# Patient Record
Sex: MALE | Race: WHITE | NOT HISPANIC OR LATINO | Employment: OTHER | URBAN - NONMETROPOLITAN AREA
[De-identification: names, ages, dates, MRNs, and addresses within clinical notes are randomized per-mention and may not be internally consistent; named-entity substitution may affect disease eponyms.]

---

## 2017-01-01 ENCOUNTER — OFFICE VISIT (OUTPATIENT)
Dept: FAMILY MEDICINE CLINIC | Facility: CLINIC | Age: 67
End: 2017-01-01

## 2017-01-01 ENCOUNTER — OFFICE VISIT (OUTPATIENT)
Dept: CARDIOLOGY | Facility: CLINIC | Age: 67
End: 2017-01-01

## 2017-01-01 ENCOUNTER — CLINICAL SUPPORT (OUTPATIENT)
Dept: FAMILY MEDICINE CLINIC | Facility: CLINIC | Age: 67
End: 2017-01-01

## 2017-01-01 ENCOUNTER — TELEPHONE (OUTPATIENT)
Dept: FAMILY MEDICINE CLINIC | Facility: CLINIC | Age: 67
End: 2017-01-01

## 2017-01-01 VITALS
HEIGHT: 72 IN | HEART RATE: 81 BPM | WEIGHT: 195.4 LBS | DIASTOLIC BLOOD PRESSURE: 58 MMHG | SYSTOLIC BLOOD PRESSURE: 116 MMHG | BODY MASS INDEX: 26.47 KG/M2

## 2017-01-01 VITALS
OXYGEN SATURATION: 97 % | BODY MASS INDEX: 26.68 KG/M2 | HEART RATE: 70 BPM | HEIGHT: 72 IN | WEIGHT: 197 LBS | SYSTOLIC BLOOD PRESSURE: 112 MMHG | DIASTOLIC BLOOD PRESSURE: 72 MMHG

## 2017-01-01 VITALS
DIASTOLIC BLOOD PRESSURE: 68 MMHG | WEIGHT: 199 LBS | HEART RATE: 80 BPM | BODY MASS INDEX: 27.86 KG/M2 | OXYGEN SATURATION: 98 % | SYSTOLIC BLOOD PRESSURE: 106 MMHG | HEIGHT: 71 IN

## 2017-01-01 VITALS
BODY MASS INDEX: 25.19 KG/M2 | SYSTOLIC BLOOD PRESSURE: 115 MMHG | HEIGHT: 72 IN | WEIGHT: 186 LBS | OXYGEN SATURATION: 96 % | TEMPERATURE: 98.7 F | DIASTOLIC BLOOD PRESSURE: 68 MMHG | HEART RATE: 100 BPM

## 2017-01-01 VITALS
BODY MASS INDEX: 26.82 KG/M2 | SYSTOLIC BLOOD PRESSURE: 115 MMHG | HEIGHT: 72 IN | OXYGEN SATURATION: 98 % | WEIGHT: 198 LBS | HEART RATE: 80 BPM | DIASTOLIC BLOOD PRESSURE: 84 MMHG

## 2017-01-01 VITALS
OXYGEN SATURATION: 94 % | TEMPERATURE: 100.6 F | SYSTOLIC BLOOD PRESSURE: 96 MMHG | HEART RATE: 68 BPM | WEIGHT: 190 LBS | HEIGHT: 72 IN | BODY MASS INDEX: 25.73 KG/M2 | DIASTOLIC BLOOD PRESSURE: 64 MMHG

## 2017-01-01 VITALS
SYSTOLIC BLOOD PRESSURE: 112 MMHG | HEART RATE: 94 BPM | DIASTOLIC BLOOD PRESSURE: 72 MMHG | HEIGHT: 71 IN | TEMPERATURE: 98.7 F | BODY MASS INDEX: 26.6 KG/M2 | WEIGHT: 190 LBS | OXYGEN SATURATION: 98 %

## 2017-01-01 DIAGNOSIS — I10 ESSENTIAL HYPERTENSION: ICD-10-CM

## 2017-01-01 DIAGNOSIS — J44.9 CHRONIC OBSTRUCTIVE PULMONARY DISEASE, UNSPECIFIED COPD TYPE (HCC): ICD-10-CM

## 2017-01-01 DIAGNOSIS — J30.89 OTHER ALLERGIC RHINITIS: ICD-10-CM

## 2017-01-01 DIAGNOSIS — G89.29 CHRONIC RIGHT HIP PAIN: Primary | ICD-10-CM

## 2017-01-01 DIAGNOSIS — M16.11 ARTHRITIS OF RIGHT HIP: ICD-10-CM

## 2017-01-01 DIAGNOSIS — E78.00 HYPERCHOLESTEROLEMIA: ICD-10-CM

## 2017-01-01 DIAGNOSIS — J11.1 INFLUENZA: ICD-10-CM

## 2017-01-01 DIAGNOSIS — S39.012A LUMBAR STRAIN, INITIAL ENCOUNTER: Primary | ICD-10-CM

## 2017-01-01 DIAGNOSIS — R05.9 COUGH: ICD-10-CM

## 2017-01-01 DIAGNOSIS — J44.1 CHRONIC OBSTRUCTIVE PULMONARY DISEASE WITH ACUTE EXACERBATION (HCC): Primary | ICD-10-CM

## 2017-01-01 DIAGNOSIS — J98.01 BRONCHOSPASM, ACUTE: ICD-10-CM

## 2017-01-01 DIAGNOSIS — Z23 NEED FOR INFLUENZA VACCINATION: ICD-10-CM

## 2017-01-01 DIAGNOSIS — E79.0 ELEVATED URIC ACID IN BLOOD: ICD-10-CM

## 2017-01-01 DIAGNOSIS — R63.4 ABNORMAL WEIGHT LOSS: ICD-10-CM

## 2017-01-01 DIAGNOSIS — I50.42 CHRONIC COMBINED SYSTOLIC AND DIASTOLIC CONGESTIVE HEART FAILURE (HCC): ICD-10-CM

## 2017-01-01 DIAGNOSIS — M25.551 CHRONIC RIGHT HIP PAIN: Primary | ICD-10-CM

## 2017-01-01 DIAGNOSIS — M19.90 ARTHRITIS: Primary | ICD-10-CM

## 2017-01-01 DIAGNOSIS — R50.81 FEVER IN OTHER DISEASES: ICD-10-CM

## 2017-01-01 DIAGNOSIS — Z23 NEED FOR ZOSTER VACCINATION: ICD-10-CM

## 2017-01-01 DIAGNOSIS — I25.10 CORONARY ARTERIOSCLEROSIS IN NATIVE ARTERY: Primary | ICD-10-CM

## 2017-01-01 DIAGNOSIS — K13.79 ORAL CAVITY PAIN: Primary | ICD-10-CM

## 2017-01-01 DIAGNOSIS — H81.12 BPPV (BENIGN PAROXYSMAL POSITIONAL VERTIGO), LEFT: Primary | ICD-10-CM

## 2017-01-01 DIAGNOSIS — J10.1 INFLUENZA A: Primary | ICD-10-CM

## 2017-01-01 LAB
CONV COMMENT: ABNORMAL
EXPIRATION DATE: ABNORMAL
FLUAV AG NPH QL: POSITIVE
FLUBV AG NPH QL: ABNORMAL
INTERNAL CONTROL: ABNORMAL
Lab: ABNORMAL
PSA SERPL-MCNC: 0.72 NG/ML (ref 0.06–4)
TESTOST FREE SERPL-MCNC: 0.6 PG/ML (ref 6.6–18.1)
TESTOST SERPL-MCNC: 56 NG/DL (ref 348–1197)

## 2017-01-01 PROCEDURE — 96372 THER/PROPH/DIAG INJ SC/IM: CPT | Performed by: FAMILY MEDICINE

## 2017-01-01 PROCEDURE — 99214 OFFICE O/P EST MOD 30 MIN: CPT | Performed by: FAMILY MEDICINE

## 2017-01-01 PROCEDURE — 90662 IIV NO PRSV INCREASED AG IM: CPT | Performed by: FAMILY MEDICINE

## 2017-01-01 PROCEDURE — 87804 INFLUENZA ASSAY W/OPTIC: CPT | Performed by: FAMILY MEDICINE

## 2017-01-01 PROCEDURE — 99214 OFFICE O/P EST MOD 30 MIN: CPT | Performed by: INTERNAL MEDICINE

## 2017-01-01 PROCEDURE — G0008 ADMIN INFLUENZA VIRUS VAC: HCPCS | Performed by: FAMILY MEDICINE

## 2017-01-01 RX ORDER — METHYLPREDNISOLONE ACETATE 80 MG/ML
120 INJECTION, SUSPENSION INTRA-ARTICULAR; INTRALESIONAL; INTRAMUSCULAR; SOFT TISSUE ONCE
Status: COMPLETED | OUTPATIENT
Start: 2017-01-01 | End: 2017-01-01

## 2017-01-01 RX ORDER — NAPROXEN 375 MG/1
375 TABLET ORAL 2 TIMES DAILY PRN
COMMUNITY

## 2017-01-01 RX ORDER — GUAIFENESIN 600 MG/1
600 TABLET, EXTENDED RELEASE ORAL 2 TIMES DAILY
Qty: 30 TABLET | Refills: 0 | Status: SHIPPED | OUTPATIENT
Start: 2017-01-01 | End: 2017-01-01

## 2017-01-01 RX ORDER — FLUTICASONE PROPIONATE 50 MCG
SPRAY, SUSPENSION (ML) NASAL
Qty: 1 BOTTLE | Refills: 5 | Status: SHIPPED | OUTPATIENT
Start: 2017-01-01

## 2017-01-01 RX ORDER — ALBUTEROL SULFATE 2.5 MG/3ML
2.5 SOLUTION RESPIRATORY (INHALATION) EVERY 4 HOURS PRN
Qty: 90 ML | Refills: 1 | Status: SHIPPED | OUTPATIENT
Start: 2017-01-01 | End: 2018-01-01 | Stop reason: SDUPTHER

## 2017-01-01 RX ORDER — MONTELUKAST SODIUM 10 MG/1
TABLET ORAL
Qty: 30 TABLET | Refills: 5 | Status: SHIPPED | OUTPATIENT
Start: 2017-01-01

## 2017-01-01 RX ORDER — HYDROCODONE BITARTRATE AND ACETAMINOPHEN 5; 325 MG/1; MG/1
TABLET ORAL
COMMUNITY
Start: 2017-01-01 | End: 2017-01-01

## 2017-01-01 RX ORDER — METHYLPREDNISOLONE 4 MG/1
TABLET ORAL
Qty: 21 TABLET | Refills: 0 | Status: SHIPPED | OUTPATIENT
Start: 2017-01-01 | End: 2017-01-01

## 2017-01-01 RX ORDER — NAPROXEN 375 MG/1
TABLET ORAL
Qty: 60 TABLET | Refills: 2 | Status: SHIPPED | OUTPATIENT
Start: 2017-01-01 | End: 2017-01-01

## 2017-01-01 RX ORDER — PREDNISONE 20 MG/1
20 TABLET ORAL DAILY
Qty: 7 TABLET | Refills: 0 | Status: SHIPPED | OUTPATIENT
Start: 2017-01-01 | End: 2017-01-01

## 2017-01-01 RX ORDER — MECLIZINE HCL 12.5 MG/1
12.5 TABLET ORAL 3 TIMES DAILY PRN
Qty: 30 TABLET | Refills: 0 | Status: SHIPPED | OUTPATIENT
Start: 2017-01-01

## 2017-01-01 RX ORDER — OSELTAMIVIR PHOSPHATE 75 MG/1
75 CAPSULE ORAL 2 TIMES DAILY
Qty: 10 CAPSULE | Refills: 0 | Status: SHIPPED | OUTPATIENT
Start: 2017-01-01 | End: 2017-01-01

## 2017-01-01 RX ORDER — KETOROLAC TROMETHAMINE 30 MG/ML
60 INJECTION, SOLUTION INTRAMUSCULAR; INTRAVENOUS ONCE
Status: COMPLETED | OUTPATIENT
Start: 2017-01-01 | End: 2017-01-01

## 2017-01-01 RX ORDER — AZITHROMYCIN 250 MG/1
TABLET, FILM COATED ORAL
COMMUNITY
Start: 2017-01-01 | End: 2017-01-01

## 2017-01-01 RX ORDER — ORPHENADRINE CITRATE 100 MG/1
100 TABLET, EXTENDED RELEASE ORAL
COMMUNITY
Start: 2017-01-01 | End: 2017-01-01

## 2017-01-01 RX ORDER — IBUPROFEN 800 MG/1
TABLET ORAL
COMMUNITY
Start: 2017-01-01 | End: 2017-01-01

## 2017-01-01 RX ORDER — GUAIFENESIN AND CODEINE PHOSPHATE 100; 10 MG/5ML; MG/5ML
SOLUTION ORAL
Qty: 180 ML | Refills: 0 | Status: SHIPPED | OUTPATIENT
Start: 2017-01-01 | End: 2018-01-01

## 2017-01-01 RX ORDER — METHYLPREDNISOLONE ACETATE 80 MG/ML
80 INJECTION, SUSPENSION INTRA-ARTICULAR; INTRALESIONAL; INTRAMUSCULAR; SOFT TISSUE ONCE
Status: COMPLETED | OUTPATIENT
Start: 2017-01-01 | End: 2017-01-01

## 2017-01-01 RX ORDER — DOXYCYCLINE 100 MG/1
100 TABLET ORAL 2 TIMES DAILY
Qty: 20 TABLET | Refills: 0 | Status: SHIPPED | OUTPATIENT
Start: 2017-01-01 | End: 2017-01-01

## 2017-01-01 RX ORDER — ALLOPURINOL 100 MG/1
100 TABLET ORAL DAILY
Qty: 30 TABLET | Refills: 2 | Status: SHIPPED | OUTPATIENT
Start: 2017-01-01

## 2017-01-01 RX ORDER — PREDNISONE 20 MG/1
TABLET ORAL
Qty: 14 TABLET | Refills: 0 | Status: SHIPPED | OUTPATIENT
Start: 2017-01-01 | End: 2018-01-01 | Stop reason: SDUPTHER

## 2017-01-01 RX ADMIN — METHYLPREDNISOLONE ACETATE 120 MG: 80 INJECTION, SUSPENSION INTRA-ARTICULAR; INTRALESIONAL; INTRAMUSCULAR; SOFT TISSUE at 12:20

## 2017-01-01 RX ADMIN — METHYLPREDNISOLONE ACETATE 120 MG: 80 INJECTION, SUSPENSION INTRA-ARTICULAR; INTRALESIONAL; INTRAMUSCULAR; SOFT TISSUE at 13:18

## 2017-01-01 RX ADMIN — METHYLPREDNISOLONE ACETATE 80 MG: 80 INJECTION, SUSPENSION INTRA-ARTICULAR; INTRALESIONAL; INTRAMUSCULAR; SOFT TISSUE at 17:14

## 2017-01-01 RX ADMIN — KETOROLAC TROMETHAMINE 60 MG: 30 INJECTION, SOLUTION INTRAMUSCULAR; INTRAVENOUS at 17:13

## 2017-01-03 ENCOUNTER — OFFICE VISIT (OUTPATIENT)
Dept: FAMILY MEDICINE CLINIC | Facility: CLINIC | Age: 67
End: 2017-01-03

## 2017-01-03 ENCOUNTER — TELEPHONE (OUTPATIENT)
Dept: FAMILY MEDICINE CLINIC | Facility: CLINIC | Age: 67
End: 2017-01-03

## 2017-01-03 VITALS
DIASTOLIC BLOOD PRESSURE: 76 MMHG | SYSTOLIC BLOOD PRESSURE: 112 MMHG | WEIGHT: 199 LBS | HEART RATE: 87 BPM | HEIGHT: 72 IN | OXYGEN SATURATION: 98 % | BODY MASS INDEX: 26.95 KG/M2

## 2017-01-03 DIAGNOSIS — I49.9 IRREGULAR HEART RHYTHM: ICD-10-CM

## 2017-01-03 DIAGNOSIS — L98.9 FACIAL LESION: Primary | ICD-10-CM

## 2017-01-03 DIAGNOSIS — E03.8 OTHER SPECIFIED HYPOTHYROIDISM: ICD-10-CM

## 2017-01-03 DIAGNOSIS — J34.2 NASAL SEPTAL DEVIATION: ICD-10-CM

## 2017-01-03 DIAGNOSIS — E78.49 OTHER HYPERLIPIDEMIA: ICD-10-CM

## 2017-01-03 DIAGNOSIS — J44.9 CHRONIC OBSTRUCTIVE PULMONARY DISEASE, UNSPECIFIED COPD TYPE (HCC): ICD-10-CM

## 2017-01-03 DIAGNOSIS — I10 ESSENTIAL HYPERTENSION: ICD-10-CM

## 2017-01-03 DIAGNOSIS — G56.02 CARPAL TUNNEL SYNDROME OF LEFT WRIST: ICD-10-CM

## 2017-01-03 DIAGNOSIS — E11.9 TYPE 2 DIABETES MELLITUS WITHOUT COMPLICATION, WITHOUT LONG-TERM CURRENT USE OF INSULIN (HCC): ICD-10-CM

## 2017-01-03 DIAGNOSIS — E55.9 VITAMIN D DEFICIENCY: ICD-10-CM

## 2017-01-03 DIAGNOSIS — Z11.59 NEED FOR HEPATITIS C SCREENING TEST: ICD-10-CM

## 2017-01-03 PROCEDURE — 99214 OFFICE O/P EST MOD 30 MIN: CPT | Performed by: FAMILY MEDICINE

## 2017-01-03 NOTE — MR AVS SNAPSHOT
Jacob Rivers   1/3/2017 9:00 AM   Office Visit    Dept Phone:  [phone]   Encounter #:  [id number]    Provider:  Lacie Ernst MD   Department:  [facility] PRIMARY CARE                Your Full Care Plan              Your Updated Medication List          This list is accurate as of: 1/3/17  9:20 AM.  Always use your most recent med list.                albuterol 108 (90 BASE) MCG/ACT inhaler   Commonly known as:  PROVENTIL HFA;VENTOLIN HFA       aspirin 81 MG tablet       carvedilol 3.125 MG tablet   Commonly known as:  COREG       cetirizine 10 MG tablet   Commonly known as:  zyrTEC       cyanocobalamin 1000 MCG tablet   Commonly known as:  VITAMIN B-12       enalapril 5 MG tablet   Commonly known as:  VASOTEC       ferrous sulfate 325 (65 FE) MG tablet       fluticasone 50 MCG/ACT nasal spray   Commonly known as:  FLONASE       folic acid 1 MG tablet   Commonly known as:  FOLVITE       furosemide 40 MG tablet   Commonly known as:  LASIX       levothyroxine 75 MCG tablet   Commonly known as:  SYNTHROID, LEVOTHROID       montelukast 10 MG tablet   Commonly known as:  SINGULAIR   Take 1 tablet by mouth every night. Take one tablet at bedtime for seasonal allergies.       omeprazole 20 MG capsule   Commonly known as:  priLOSEC       potassium chloride 20 MEQ CR tablet   Commonly known as:  K-DUR,KLOR-CON       rosuvastatin 40 MG tablet   Commonly known as:  CRESTOR       tamsulosin 0.4 MG capsule 24 hr capsule   Commonly known as:  FLOMAX       tiotropium 18 MCG per inhalation capsule   Commonly known as:  SPIRIVA       traMADol 50 MG tablet   Commonly known as:  ULTRAM       valACYclovir 1000 MG tablet   Commonly known as:  VALTREX               You Were Diagnosed With        Codes Comments    Facial lesion    -  Primary ICD-10-CM: L98.9  ICD-9-CM: 709.9     Essential hypertension     ICD-10-CM: I10  ICD-9-CM: 401.9     Chronic obstructive pulmonary disease,  "unspecified COPD type     ICD-10-CM: J44.9  ICD-9-CM: 496     Other specified hypothyroidism     ICD-10-CM: E03.8  ICD-9-CM: 244.8     Other hyperlipidemia     ICD-10-CM: E78.4  ICD-9-CM: 272.4     Carpal tunnel syndrome of left wrist     ICD-10-CM: G56.02  ICD-9-CM: 354.0     Type 2 diabetes mellitus without complication, without long-term current use of insulin     ICD-10-CM: E11.9  ICD-9-CM: 250.00       Instructions     None    Patient Instructions History      Upcoming Appointments     Visit Type Date Time Department    FOLLOW UP 1/3/2017  9:00 AM [facility]    FOLLOW UP 1/19/2017 10:45 AM [facility]      [facility] Signup     Our records indicate that you have declined [facility] [employer] signup. If you would like to sign up for [employer], please email [email or web address] or call [phone] to obtain an activation code.             Other Info from Your Visit           Your Appointments     Jan 19, 2017 10:45 AM EST   Follow Up with Logan Mcdonald MD   [facility] (--)    [address] [address] [phone]           Arrive 15 minutes prior to appointment.              Allergies     Penicillins        Reason for Visit     Nasal Congestion     Cyst on side of nose.      Vital Signs     Blood Pressure Pulse Height Weight Oxygen Saturation Body Mass Index    112/76 87 72\" (182.9 cm) 199 lb (90.3 kg) 98% 26.99 kg/m2    Smoking Status                   Former Smoker           Problems and Diagnoses Noted     Carpal tunnel syndrome on left    Chronic airway obstruction    Diabetes    High cholesterol or triglycerides    High blood pressure    Underactive thyroid    Facial lesion    -  Primary        "

## 2017-01-03 NOTE — PROGRESS NOTES
"Subjective   Jacob Kalpesh Rivers is a 66 y.o. male.     History of Present Illness   Mr. Rivers is a 67 [facility] Dr. Mcdonald and Dr. Finley. He has h/o COPD/lung CA, HTN, HLP, CAD s/p CABG. Has upcoming f/u visit with Dr. Mcdonald.  Has had regular f/u with pulmonology as well. No recent changes in medications. He denies CP or palpitations.  His SOA is stable.     Since his last visit he has had eval per Dr. Pete in ortho as well as NCS per Dr. 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 He has decided to put off surgery at this time.     He complaints of enlarging cystic lesion left cheek. Occasionally able to express \"cheesy\" material of mass.  Mildly TTP.    Other [facility] include diet controlled DM, hypothyroidism. Taking thyroid replacement as rx'd. Due for f/u labs.  He is not fasting today. Due for re-eval of vit D def as weel.    He continues to have chronic nasal congestion related to nasal septum deviation.  Has had eval per ENT previously but chose not to have surgery.    The following portions of the patient's history were reviewed and updated as appropriate: allergies, current medications, past family history, past medical history, past social history, past surgical history and problem list.    Review of Systems   Constitutional: Positive for fatigue. Negative for activity change, appetite change, diaphoresis, fever and unexpected weight change.   HENT: Positive for hearing loss (chronic). Negative for congestion, mouth sores, nosebleeds, rhinorrhea, sinus pressure, sneezing, sore throat and trouble swallowing.    Eyes: Negative.  Negative for visual disturbance.   Respiratory: Positive for cough (intermittent dry) and shortness of breath (at baseline in regard to RAYO). Negative for wheezing.    Cardiovascular: Negative.  Negative for chest pain, palpitations and leg swelling.   Gastrointestinal: Negative.  Negative for abdominal pain, " blood in stool, diarrhea, nausea and vomiting.   Endocrine: Negative.  Negative for polydipsia and polyuria.   Genitourinary: Negative.  Negative for dysuria and hematuria.   Musculoskeletal: Positive for arthralgias, joint swelling and neck stiffness.   Skin: Negative for rash and wound.        See HPI   Neurological: Positive for dizziness and numbness. Negative for tremors, syncope, weakness and headaches.   Hematological: Negative.    Psychiatric/Behavioral: Negative.  Negative for confusion, dysphoric mood and sleep disturbance. The patient is not nervous/anxious.      Objective    Vitals:    01/03/17 0856   BP: 112/76   Pulse: 87   SpO2: 98%     Body mass index is 26.99 kg/(m^2).  Last 2 weights    01/03/17  0856   Weight: 199 lb (90.3 kg)     Physical Exam   Constitutional: He is oriented to person, place, and time. He appears well-developed and well-nourished. He is cooperative. He does not appear ill. No distress.   HENT:   Head: Normocephalic and atraumatic.   Right Ear: Decreased hearing is noted.   Left Ear: Decreased hearing is noted.   Nose: Septal deviation (to left with occlusion of right nasal passage) present. No mucosal edema or rhinorrhea. No epistaxis.   Mouth/Throat: Uvula is midline, oropharynx is clear and moist and mucous membranes are normal. Abnormal dentition.   Dental implants in place   Eyes: Conjunctivae and lids are normal.   Neck: Phonation normal. Neck supple. Normal carotid pulses present. No thyroid mass and no thyromegaly present.   Cardiovascular: Normal rate, S1 normal, S2 normal and intact distal pulses.  A regularly irregular rhythm present.   Murmur heard.   Systolic (along sternal border) murmur is present with a grade of 2/6   Premature beat every 3rd beat   Pulmonary/Chest: Effort normal. No respiratory distress. He has decreased breath sounds in the left upper field, the left middle field and the left lower field. He has no wheezes. He has no rhonchi. He has no rales.    Musculoskeletal:        Left wrist: He exhibits decreased range of motion (mildly decreased all planes due to pain), bony tenderness (diffuse), swelling (mild) and deformity (chronic joint deformity). He exhibits no crepitus.       Vascular Status -  His exam exhibits no right foot edema. His exam exhibits no left foot edema.  Lymphadenopathy:     He has no cervical adenopathy.   Neurological: He is alert and oriented to person, place, and time. He has normal strength. He displays no tremor. No cranial nerve deficit or sensory deficit. Gait normal.   Skin: Skin is warm and dry. Lesion (cystic lesion left cheek, mildly TTP) noted. No bruising and no rash noted.   Psychiatric: He has a normal mood and affect. His behavior is normal.   Nursing note and vitals reviewed.    Consult note per Dr. Pete reviewed on chart.  EMG/NCS reviewed on chart.    Assessment/Plan   Jacob was seen today for nasal congestion and cyst.    Diagnoses and all orders for this visit:    Facial lesion  -     Ambulatory Referral to Dermatology    Essential hypertension  -     CBC (No Diff); Future  -     Comprehensive Metabolic Panel; Future  -     TSH; Future    Chronic obstructive pulmonary disease, unspecified COPD type  -     CBC (No Diff); Future  -     Comprehensive Metabolic Panel; Future    Other specified hypothyroidism    Other hyperlipidemia  -     Comprehensive Metabolic Panel; Future  -     Lipid Panel; Future  -     TSH; Future    Carpal tunnel syndrome of left wrist    Type 2 diabetes mellitus without complication, without long-term current use of insulin  -     Comprehensive Metabolic Panel; Future  -     TSH; Future  -     Hemoglobin A1c; Future    Vitamin D deficiency  -     Vitamin D 25 Hydroxy; Future    Need for hepatitis C screening test  -     Hepatitis C Antibody; Future    Nasal septal deviation    Irregular heart rhythm    Regularly irregular heart rhythm but BP well controlled, HR normal and pt clinically stable.  I  will contact patient regarding test results and provide instructions regarding any necessary changes in plan of care.  Continue current meds for now.  F/U with Dr. Mcdonald and pulmonology as scheduled.  F/u with me in 3 months, sooner as needed/instructed.  Heart healthy diet, regular walking.  Patient was encouraged to keep me informed of any acute changes, or any new concerning symptoms.  Patient voiced understanding of all instructions and denied further questions.

## 2017-01-06 ENCOUNTER — LAB (OUTPATIENT)
Dept: FAMILY MEDICINE CLINIC | Facility: CLINIC | Age: 67
End: 2017-01-06

## 2017-01-06 DIAGNOSIS — E55.9 VITAMIN D DEFICIENCY: ICD-10-CM

## 2017-01-06 DIAGNOSIS — E78.49 OTHER HYPERLIPIDEMIA: ICD-10-CM

## 2017-01-06 DIAGNOSIS — I10 ESSENTIAL HYPERTENSION: ICD-10-CM

## 2017-01-06 DIAGNOSIS — Z11.59 NEED FOR HEPATITIS C SCREENING TEST: ICD-10-CM

## 2017-01-06 DIAGNOSIS — E11.9 TYPE 2 DIABETES MELLITUS WITHOUT COMPLICATION, WITHOUT LONG-TERM CURRENT USE OF INSULIN (HCC): ICD-10-CM

## 2017-01-06 DIAGNOSIS — J44.9 CHRONIC OBSTRUCTIVE PULMONARY DISEASE, UNSPECIFIED COPD TYPE (HCC): ICD-10-CM

## 2017-01-06 LAB
25(OH)D3 SERPL-MCNC: 15 NG/ML
ALBUMIN SERPL-MCNC: 3.9 G/DL (ref 3.2–4.8)
ALBUMIN/GLOB SERPL: 1.3 G/DL (ref 1.5–2.5)
ALP SERPL-CCNC: 87 U/L (ref 25–100)
ALT SERPL W P-5'-P-CCNC: 7 U/L (ref 7–40)
ANION GAP SERPL CALCULATED.3IONS-SCNC: 3 MMOL/L (ref 3–11)
ARTICHOKE IGE QN: 74 MG/DL (ref 0–130)
AST SERPL-CCNC: 11 U/L (ref 0–33)
BILIRUB SERPL-MCNC: 0.7 MG/DL (ref 0.3–1.2)
BUN BLD-MCNC: 16 MG/DL (ref 9–23)
BUN/CREAT SERPL: 16 (ref 7–25)
CALCIUM SPEC-SCNC: 9.5 MG/DL (ref 8.7–10.4)
CHLORIDE SERPL-SCNC: 101 MMOL/L (ref 99–109)
CHOLEST SERPL-MCNC: 145 MG/DL (ref 0–200)
CO2 SERPL-SCNC: 37 MMOL/L (ref 20–31)
CREAT BLD-MCNC: 1 MG/DL (ref 0.6–1.3)
DEPRECATED RDW RBC AUTO: 45.3 FL (ref 37–54)
ERYTHROCYTE [DISTWIDTH] IN BLOOD BY AUTOMATED COUNT: 13.8 % (ref 11.3–14.5)
GFR SERPL CREATININE-BSD FRML MDRD: 75 ML/MIN/1.73
GLOBULIN UR ELPH-MCNC: 2.9 GM/DL
GLUCOSE BLD-MCNC: 94 MG/DL (ref 70–100)
HBA1C MFR BLD: 5.6 % (ref 4.8–5.6)
HCT VFR BLD AUTO: 35.2 % (ref 38.9–50.9)
HCV AB SER DONR QL: NORMAL
HDLC SERPL-MCNC: 54 MG/DL (ref 40–60)
HGB BLD-MCNC: 11.2 G/DL (ref 13.1–17.5)
MCH RBC QN AUTO: 28.4 PG (ref 27–31)
MCHC RBC AUTO-ENTMCNC: 31.8 G/DL (ref 32–36)
MCV RBC AUTO: 89.1 FL (ref 80–99)
PLATELET # BLD AUTO: 214 10*3/MM3 (ref 150–450)
PMV BLD AUTO: 9.7 FL (ref 6–12)
POTASSIUM BLD-SCNC: 4.1 MMOL/L (ref 3.5–5.5)
PROT SERPL-MCNC: 6.8 G/DL (ref 5.7–8.2)
RBC # BLD AUTO: 3.95 10*6/MM3 (ref 4.2–5.76)
SODIUM BLD-SCNC: 141 MMOL/L (ref 132–146)
TRIGL SERPL-MCNC: 71 MG/DL (ref 0–150)
TSH SERPL DL<=0.05 MIU/L-ACNC: 0.98 MIU/ML (ref 0.35–5.35)
WBC NRBC COR # BLD: 7.04 10*3/MM3 (ref 3.5–10.8)

## 2017-01-06 PROCEDURE — 80053 COMPREHEN METABOLIC PANEL: CPT | Performed by: FAMILY MEDICINE

## 2017-01-06 PROCEDURE — 85027 COMPLETE CBC AUTOMATED: CPT | Performed by: FAMILY MEDICINE

## 2017-01-06 PROCEDURE — 86803 HEPATITIS C AB TEST: CPT | Performed by: FAMILY MEDICINE

## 2017-01-06 PROCEDURE — 80061 LIPID PANEL: CPT | Performed by: FAMILY MEDICINE

## 2017-01-06 PROCEDURE — 83036 HEMOGLOBIN GLYCOSYLATED A1C: CPT | Performed by: FAMILY MEDICINE

## 2017-01-06 PROCEDURE — 36415 COLL VENOUS BLD VENIPUNCTURE: CPT | Performed by: FAMILY MEDICINE

## 2017-01-06 PROCEDURE — 84443 ASSAY THYROID STIM HORMONE: CPT | Performed by: FAMILY MEDICINE

## 2017-01-06 PROCEDURE — 82306 VITAMIN D 25 HYDROXY: CPT | Performed by: FAMILY MEDICINE

## 2017-01-09 ENCOUNTER — OFFICE VISIT (OUTPATIENT)
Dept: FAMILY MEDICINE CLINIC | Facility: CLINIC | Age: 67
End: 2017-01-09

## 2017-01-09 VITALS
SYSTOLIC BLOOD PRESSURE: 112 MMHG | HEIGHT: 72 IN | DIASTOLIC BLOOD PRESSURE: 72 MMHG | HEART RATE: 82 BPM | OXYGEN SATURATION: 98 % | WEIGHT: 199 LBS | BODY MASS INDEX: 26.95 KG/M2 | TEMPERATURE: 99.7 F

## 2017-01-09 DIAGNOSIS — J44.1 COPD EXACERBATION (HCC): Primary | ICD-10-CM

## 2017-01-09 PROCEDURE — 96372 THER/PROPH/DIAG INJ SC/IM: CPT | Performed by: FAMILY MEDICINE

## 2017-01-09 PROCEDURE — 99214 OFFICE O/P EST MOD 30 MIN: CPT | Performed by: FAMILY MEDICINE

## 2017-01-09 RX ORDER — CEFTRIAXONE 1 G/1
1 INJECTION, POWDER, FOR SOLUTION INTRAMUSCULAR; INTRAVENOUS ONCE
Status: COMPLETED | OUTPATIENT
Start: 2017-01-09 | End: 2017-01-09

## 2017-01-09 RX ORDER — AZITHROMYCIN 250 MG/1
TABLET, FILM COATED ORAL
Qty: 6 TABLET | Refills: 0 | Status: SHIPPED | OUTPATIENT
Start: 2017-01-09 | End: 2017-01-19

## 2017-01-09 RX ORDER — METHYLPREDNISOLONE ACETATE 80 MG/ML
120 INJECTION, SUSPENSION INTRA-ARTICULAR; INTRALESIONAL; INTRAMUSCULAR; SOFT TISSUE ONCE
Status: COMPLETED | OUTPATIENT
Start: 2017-01-09 | End: 2017-01-09

## 2017-01-09 RX ORDER — ALBUTEROL SULFATE 2.5 MG/3ML
2.5 SOLUTION RESPIRATORY (INHALATION) EVERY 4 HOURS PRN
Qty: 90 ML | Refills: 1 | Status: SHIPPED | OUTPATIENT
Start: 2017-01-09 | End: 2017-01-01 | Stop reason: SDUPTHER

## 2017-01-09 RX ORDER — PREDNISONE 20 MG/1
TABLET ORAL
Qty: 10 TABLET | Refills: 0 | Status: SHIPPED | OUTPATIENT
Start: 2017-01-09 | End: 2017-01-19

## 2017-01-09 RX ADMIN — METHYLPREDNISOLONE ACETATE 120 MG: 80 INJECTION, SUSPENSION INTRA-ARTICULAR; INTRALESIONAL; INTRAMUSCULAR; SOFT TISSUE at 11:57

## 2017-01-09 RX ADMIN — CEFTRIAXONE 1 G: 1 INJECTION, POWDER, FOR SOLUTION INTRAMUSCULAR; INTRAVENOUS at 11:58

## 2017-01-09 NOTE — PROGRESS NOTES
Subjective   Jacob Rivers is a 66 y.o. male.     Shortness of Breath   This is a new problem. The current episode started in the past 7 days. The problem occurs constantly. The problem has been gradually worsening. Associated symptoms include chest pain (soreness in right lung), hemoptysis (minimal), sputum production (mucoid) and wheezing. Pertinent negatives include no abdominal pain, coryza, ear pain, fever, headaches, leg swelling, orthopnea, PND, rash, rhinorrhea, sore throat, swollen glands, syncope or vomiting. The symptoms are aggravated by lying flat and exercise (cold air). Risk factors: non-smoker. He has tried beta agonist inhalers for the symptoms. The treatment provided mild relief. His past medical history is significant for CAD, chronic lung disease, COPD and a heart failure.   Has had recent ill exposure (URI/bronchitis symptoms).    The following portions of the patient's history were reviewed and updated as appropriate: allergies, current medications, past family history, past medical history, past social history and past surgical history. Problem list updated as well.    Review of Systems   Constitutional: Positive for fatigue. Negative for chills and fever.   HENT: Negative for ear pain, rhinorrhea, sore throat and trouble swallowing.    Eyes: Negative for pain, discharge and redness.   Respiratory: Positive for cough (slight increase from baseline), hemoptysis (minimal), sputum production (mucoid), shortness of breath and wheezing.    Cardiovascular: Positive for chest pain (soreness in right lung). Negative for palpitations, orthopnea, leg swelling, syncope and PND.   Gastrointestinal: Negative for abdominal pain, diarrhea, nausea and vomiting.   Genitourinary: Negative for dysuria and hematuria.   Musculoskeletal: Positive for arthralgias. Negative for myalgias.   Skin: Negative for rash.   Neurological: Positive for weakness (generalized). Negative for dizziness, syncope and headaches.    Psychiatric/Behavioral: Positive for sleep disturbance. Negative for confusion.     Objective    Vitals:    01/09/17 1025   BP: 112/72   Pulse: 82   Temp: 99.7 °F (37.6 °C)   SpO2: 98%     Body mass index is 26.99 kg/(m^2).  Last 2 weights    01/09/17  1025   Weight: 199 lb (90.3 kg)     Physical Exam   Constitutional: He is oriented to person, place, and time. He appears well-developed and well-nourished. He is cooperative. He appears ill (mildly). No distress.   HENT:   Head: Normocephalic and atraumatic.   Right Ear: Tympanic membrane, external ear and ear canal normal. Decreased hearing is noted.   Left Ear: Tympanic membrane, external ear and ear canal normal. Decreased hearing is noted.   Nose: Mucosal edema present. No rhinorrhea.   Mouth/Throat: Uvula is midline, oropharynx is clear and moist and mucous membranes are normal. Mucous membranes are not dry. No oral lesions. No posterior oropharyngeal edema or posterior oropharyngeal erythema.   Eyes: Conjunctivae and lids are normal.   Neck: Neck supple. No thyroid mass and no thyromegaly present.   Cardiovascular: Normal rate, regular rhythm and intact distal pulses.    Pulmonary/Chest: Effort normal. No respiratory distress. He has decreased breath sounds in the left upper field, the left middle field and the left lower field. He has no wheezes. He has no rhonchi. He has rales in the right middle field and the right lower field.       Vascular Status -  His exam exhibits no right foot edema. His exam exhibits no left foot edema.  Lymphadenopathy:     He has no cervical adenopathy.   Neurological: He is alert and oriented to person, place, and time. He has normal strength. Gait normal.   Skin: Skin is warm and dry. No bruising and no rash noted. There is pallor.   Psychiatric: He has a normal mood and affect. His behavior is normal. Cognition and memory are normal.   Nursing note and vitals reviewed.    Assessment/Plan   Jacob was seen today for shortness of  breath.    Diagnoses and all orders for this visit:    COPD exacerbation  -     albuterol (PROVENTIL) (2.5 MG/3ML) 0.083% nebulizer solution; Take 2.5 mg by nebulization Every 4 (Four) Hours As Needed for wheezing.  -     azithromycin (ZITHROMAX Z-ALBIN) 250 MG tablet; Take 2 tablets the first day, then 1 tablet daily for 4 days.  -     predniSONE (DELTASONE) 20 MG tablet; 1 po bid with food  -     methylPREDNISolone acetate (DEPO-medrol) injection 120 mg; Inject 1.5 mL into the shoulder, thigh, or buttocks 1 (One) Time.  -     cefTRIAXone (ROCEPHIN) injection 1 g; Inject 1 g into the shoulder, thigh, or buttocks 1 (One) Time.    COPD exacerbation with concern for underlying PNE. I have reviewed further diagnostic and tx options. He is amenable to Tx as above with CXR if no improvement in next 48 hrs.  Rocephin 1 gm IM in office today.  Depo-medrol 120 mg IM in office today.  Meds as above, changing duo-neb to plain albuterol during illness.  Increase fluids.  F/U in 5 days for recheck, sooner as needed.  CXR if no improvement or acute worsening.  Patient was encouraged to keep me informed of any acute changes, lack of improvement, or any new concerning symptoms.  Patient voiced understanding of all instructions and denied further questions.

## 2017-01-09 NOTE — MR AVS SNAPSHOT
Jacob Rivers   1/9/2017 10:15 AM   Office Visit    Dept Phone:  [phone]   Encounter #:  [id number]    Provider:  Lacie Ernst MD   Department:  [facility] PRIMARY CARE                Your Full Care Plan              Today's Medication Changes          These changes are accurate as of: 1/9/17 10:52 AM.  If you have any questions, ask your nurse or doctor.               New Medication(s)Ordered:     azithromycin 250 MG tablet   Commonly known as:  ZITHROMAX Z-ALBIN   Take 2 tablets the first day, then 1 tablet daily for 4 days.   Started by:  Lacie Ernst MD       predniSONE 20 MG tablet   Commonly known as:  DELTASONE   1 po bid with food   Started by:  Lacie Ernst MD         Medication(s)that have changed:     * albuterol 108 (90 BASE) MCG/ACT inhaler   Commonly known as:  PROVENTIL HFA;VENTOLIN HFA   INHALE 2 PUFFS EVERY 4 HOURS AS NEEDED.   What changed:  Another medication with the same name was added. Make sure you understand how and when to take each.   Changed by:  Lacie Ernst MD       * albuterol (2.5 MG/3ML) 0.083% nebulizer solution   Commonly known as:  PROVENTIL   Take 2.5 mg by nebulization Every 4 (Four) Hours As Needed for wheezing.   What changed:  You were already taking a medication with the same name, and this prescription was added. Make sure you understand how and when to take each.   Changed by:  Lacie Ernst MD       * Notice:  This list has 2 medication(s) that are the same as other medications prescribed for you. Read the directions carefully, and ask your doctor or other care provider to review them with you.         Where to Get Your Medications      These medications were sent to [facility] [address] [place] [address] - [address] [facility] - [phone]  - [phone]   [address] [facility] [address]     Phone:  [phone]     albuterol (2.5 MG/3ML) 0.083% nebulizer solution    azithromycin 250 MG tablet    predniSONE 20 MG  tablet                  Your Updated Medication List          This list is accurate as of: 1/9/17 10:52 AM.  Always use your most recent med list.                * albuterol 108 (90 BASE) MCG/ACT inhaler   Commonly known as:  PROVENTIL HFA;VENTOLIN HFA       * albuterol (2.5 MG/3ML) 0.083% nebulizer solution   Commonly known as:  PROVENTIL   Take 2.5 mg by nebulization Every 4 (Four) Hours As Needed for wheezing.       aspirin 81 MG tablet       azithromycin 250 MG tablet   Commonly known as:  ZITHROMAX Z-ALBIN   Take 2 tablets the first day, then 1 tablet daily for 4 days.       carvedilol 3.125 MG tablet   Commonly known as:  COREG       cetirizine 10 MG tablet   Commonly known as:  zyrTEC       cyanocobalamin 1000 MCG tablet   Commonly known as:  VITAMIN B-12       enalapril 5 MG tablet   Commonly known as:  VASOTEC       ferrous sulfate 325 (65 FE) MG tablet       fluticasone 50 MCG/ACT nasal spray   Commonly known as:  FLONASE       folic acid 1 MG tablet   Commonly known as:  FOLVITE       furosemide 40 MG tablet   Commonly known as:  LASIX       levothyroxine 75 MCG tablet   Commonly known as:  SYNTHROID, LEVOTHROID       montelukast 10 MG tablet   Commonly known as:  SINGULAIR   Take 1 tablet by mouth every night. Take one tablet at bedtime for seasonal allergies.       omeprazole 20 MG capsule   Commonly known as:  priLOSEC       potassium chloride 20 MEQ CR tablet   Commonly known as:  K-DUR,KLOR-CON       predniSONE 20 MG tablet   Commonly known as:  DELTASONE   1 po bid with food       rosuvastatin 40 MG tablet   Commonly known as:  CRESTOR       tamsulosin 0.4 MG capsule 24 hr capsule   Commonly known as:  FLOMAX       tiotropium 18 MCG per inhalation capsule   Commonly known as:  SPIRIVA       traMADol 50 MG tablet   Commonly known as:  ULTRAM       valACYclovir 1000 MG tablet   Commonly known as:  VALTREX       * Notice:  This list has 2 medication(s) that are the same as other medications prescribed  "for you. Read the directions carefully, and ask your doctor or other care provider to review them with you.            You Were Diagnosed With        Codes Comments    COPD exacerbation    -  Primary ICD-10-CM: J44.1  ICD-9-CM: 491.21       Instructions     None    Patient Instructions History      Upcoming Appointments     Visit Type Date Time Department    SAME DAY 1/9/2017 10:15 AM [facility]    FOLLOW UP 1/19/2017 10:45 AM [facility] [place]    SUBSEQUENT MEDICARE WELLNESS 1/31/2017 10:30 AM [facility]    FOLLOW UP 4/3/2017  9:00 AM [facility]      MyChart Signup     Our records indicate that you have declined [employer] My[employer] signup. If you would like to sign up for [employer], please email [email or web address] or call [phone] to obtain an activation code.             Other Info from Your Visit           Your Appointments     Jan 19, 2017 10:45 AM EST   Follow Up with Logan Mcdonald MD   [facility] CARDIOLOGY (--)    [address] [facility] [address]  [facility] [address] [phone]           Arrive 15 minutes prior to appointment.            Jan 31, 2017 10:30 AM EST   Subsequent Medicare Wellness with Lacie Ernst MD   [facility] PRIMARY CARE (--)    [address] [facility] [address]   [phone]            Apr 03, 2017  9:00 AM EDT   Follow Up with Lacie Ernst MD   [facility] PRIMARY CARE (--)    [address] [facility] [address]   [phone]           Arrive 15 minutes prior to appointment.              Allergies     Penicillins        Reason for Visit     Shortness of Breath feels congested in chest, hurts on right side of his lungs.      Vital Signs     Blood Pressure Pulse Temperature Height Weight Oxygen Saturation    112/72 82 99.7 °F (37.6 °C) 72\" (182.9 cm) 199 lb (90.3 kg) 98%    Body Mass Index Smoking Status                26.99 kg/m2 Former Smoker          Problems and Diagnoses Noted     Chronic bronchitis    " -  Primary

## 2017-01-13 ENCOUNTER — OFFICE VISIT (OUTPATIENT)
Dept: FAMILY MEDICINE CLINIC | Facility: CLINIC | Age: 67
End: 2017-01-13

## 2017-01-13 VITALS
DIASTOLIC BLOOD PRESSURE: 76 MMHG | BODY MASS INDEX: 27.63 KG/M2 | HEART RATE: 64 BPM | WEIGHT: 204 LBS | HEIGHT: 72 IN | OXYGEN SATURATION: 97 % | SYSTOLIC BLOOD PRESSURE: 122 MMHG

## 2017-01-13 DIAGNOSIS — J44.9 CHRONIC OBSTRUCTIVE PULMONARY DISEASE, UNSPECIFIED COPD TYPE (HCC): Primary | ICD-10-CM

## 2017-01-13 PROCEDURE — 99213 OFFICE O/P EST LOW 20 MIN: CPT | Performed by: FAMILY MEDICINE

## 2017-01-13 NOTE — PROGRESS NOTES
Subjective   Jacob Rivers is a 66 y.o. male.     History of Present Illness   Mr. Rivers presents today for recheck after sick visit earlier this week. Tx'd for COPD exacerbation and concern for possible PNE.  He has taken meds as rx'd. Feels the albuterol nebs did help more than DuoNebs.  He reports feeling much improved. Cough and chest congestion have improved. No CP, fever, hemoptysis. Minimal sputum production.    The following portions of the patient's history were reviewed and updated as appropriate: allergies, current medications, past family history, past medical history, past social history, past surgical history and problem list.    Review of Systems   Constitutional: Negative for chills, fatigue and fever.   HENT: Negative for congestion, rhinorrhea, sinus pressure, sore throat and trouble swallowing.    Eyes: Negative for pain, discharge and redness.   Respiratory: Negative for cough, shortness of breath and wheezing.    Cardiovascular: Negative for chest pain, palpitations and leg swelling.   Gastrointestinal: Negative for diarrhea, nausea and vomiting.   Skin: Negative for rash.   Neurological: Negative for headaches.   Psychiatric/Behavioral: Negative for sleep disturbance.     Objective    Vitals:    01/13/17 1010   BP: 122/76   Pulse: 64   SpO2: 97%     Body mass index is 27.67 kg/(m^2).  Last 2 weights    01/13/17  1010   Weight: 204 lb (92.5 kg)     Physical Exam   Constitutional: He is oriented to person, place, and time. He appears well-developed and well-nourished. He is cooperative. He does not appear ill. No distress.   HENT:   Head: Normocephalic and atraumatic.   Right Ear: Tympanic membrane, external ear and ear canal normal. Decreased hearing is noted.   Left Ear: Tympanic membrane, external ear and ear canal normal. Decreased hearing is noted.   Nose: Nose normal.   Mouth/Throat: Oropharynx is clear and moist and mucous membranes are normal.   Eyes: Conjunctivae and lids are normal.    Cardiovascular: Normal rate.  A regularly irregular rhythm present.   Murmur (stable, baseline) heard.   Systolic murmur is present with a grade of 3/6   Pulmonary/Chest: Effort normal. He has decreased breath sounds (baseline) in the left upper field, the left middle field and the left lower field. He has no wheezes. He has no rhonchi. He has no rales.   Lymphadenopathy:     He has no cervical adenopathy.   Neurological: He is alert and oriented to person, place, and time.   Skin: Skin is warm and dry. No rash noted.   Psychiatric: He has a normal mood and affect. His behavior is normal.   Nursing note and vitals reviewed.    Assessment/Plan   Jacob was seen today for copd and follow-up.    Diagnoses and all orders for this visit:    Chronic obstructive pulmonary disease, unspecified COPD type    Recent COPD exacerbation essentially resolved.  Continue maintenance meds.  F/U as scheduled.  Patient was encouraged to keep me informed of any acute changes, or any new concerning symptoms.  Patient voiced understanding of all instructions and denied further questions.

## 2017-01-13 NOTE — MR AVS SNAPSHOT
Jacob Rivers   1/13/2017 10:15 AM   Office Visit    Dept Phone:  [phone]   Encounter #:  [id number]    Provider:  Lacie Ernst MD   Department:  [facility] PRIMARY CARE                Your Full Care Plan              Your Updated Medication List          This list is accurate as of: 1/13/17 10:30 AM.  Always use your most recent med list.                * albuterol 108 (90 BASE) MCG/ACT inhaler   Commonly known as:  PROVENTIL HFA;VENTOLIN HFA       * albuterol (2.5 MG/3ML) 0.083% nebulizer solution   Commonly known as:  PROVENTIL   Take 2.5 mg by nebulization Every 4 (Four) Hours As Needed for wheezing.       aspirin 81 MG tablet       azithromycin 250 MG tablet   Commonly known as:  ZITHROMAX Z-ALBIN   Take 2 tablets the first day, then 1 tablet daily for 4 days.       carvedilol 3.125 MG tablet   Commonly known as:  COREG       cetirizine 10 MG tablet   Commonly known as:  zyrTEC       cyanocobalamin 1000 MCG tablet   Commonly known as:  VITAMIN B-12       enalapril 5 MG tablet   Commonly known as:  VASOTEC       ferrous sulfate 325 (65 FE) MG tablet       fluticasone 50 MCG/ACT nasal spray   Commonly known as:  FLONASE       folic acid 1 MG tablet   Commonly known as:  FOLVITE       furosemide 40 MG tablet   Commonly known as:  LASIX       levothyroxine 75 MCG tablet   Commonly known as:  SYNTHROID, LEVOTHROID       montelukast 10 MG tablet   Commonly known as:  SINGULAIR   Take 1 tablet by mouth every night. Take one tablet at bedtime for seasonal allergies.       omeprazole 20 MG capsule   Commonly known as:  priLOSEC       potassium chloride 20 MEQ CR tablet   Commonly known as:  K-DUR,KLOR-CON       predniSONE 20 MG tablet   Commonly known as:  DELTASONE   1 po bid with food       rosuvastatin 40 MG tablet   Commonly known as:  CRESTOR       tamsulosin 0.4 MG capsule 24 hr capsule   Commonly known as:  FLOMAX       tiotropium 18 MCG per inhalation capsule    "  Commonly known as:  SPIRIVA       traMADol 50 MG tablet   Commonly known as:  ULTRAM       valACYclovir 1000 MG tablet   Commonly known as:  VALTREX       * Notice:  This list has 2 medication(s) that are the same as other medications prescribed for you. Read the directions carefully, and ask your doctor or other care provider to review them with you.            You Were Diagnosed With        Codes Comments    Chronic obstructive pulmonary disease, unspecified COPD type    -  Primary ICD-10-CM: J44.9  ICD-9-CM: 496       Instructions     None    Patient Instructions History      Upcoming Appointments     Visit Type Date Time Department    FOLLOW UP 1/13/2017 10:15 AM [facility]    FOLLOW UP 1/19/2017 10:45 AM [facility] [place]    SUBSEQUENT MEDICARE WELLNESS 1/31/2017 10:30 AM [facility]    FOLLOW UP 4/3/2017  9:00 AM [facility]      MyChart Signup     Our records indicate that you have declined [employer] MyChart signup. If you would like to sign up for MyChart, please email [email or web address] or call [phone] to obtain an activation code.             Other Info from Your Visit           Your Appointments     Jan 19, 2017 10:45 AM EST   Follow Up with Logan Mcdonald MD   [facility] CARDIOLOGY (--)    [address]  [facility] [address] [phone]           Arrive 15 minutes prior to appointment.            Jan 31, 2017 10:30 AM EST   Subsequent Medicare Wellness with Lacie Ernst MD   [facility] PRIMARY CARE (--)    [address]  [facility] [address]   [phone]            Apr 03, 2017  9:00 AM EDT   Follow Up with Lacie Ernst MD   [facility] PRIMARY CARE (--)    [address]  [facility] [address]   [phone]           Arrive 15 minutes prior to appointment.              Allergies     Penicillins        Vital Signs     Blood Pressure Pulse Height Weight Oxygen Saturation Body Mass Index    122/76 64 72\" " (182.9 cm) 204 lb (92.5 kg) 97% 27.67 kg/m2    Smoking Status                   Former Smoker           Problems and Diagnoses Noted     Chronic airway obstruction

## 2017-01-19 ENCOUNTER — OFFICE VISIT (OUTPATIENT)
Dept: CARDIOLOGY | Facility: CLINIC | Age: 67
End: 2017-01-19

## 2017-01-19 VITALS
HEART RATE: 88 BPM | OXYGEN SATURATION: 98 % | DIASTOLIC BLOOD PRESSURE: 62 MMHG | WEIGHT: 200.4 LBS | HEIGHT: 72 IN | BODY MASS INDEX: 27.14 KG/M2 | RESPIRATION RATE: 16 BRPM | SYSTOLIC BLOOD PRESSURE: 122 MMHG

## 2017-01-19 DIAGNOSIS — I25.10 CORONARY ARTERY DISEASE INVOLVING NATIVE CORONARY ARTERY OF NATIVE HEART WITHOUT ANGINA PECTORIS: Primary | ICD-10-CM

## 2017-01-19 DIAGNOSIS — I50.22 CHRONIC SYSTOLIC CONGESTIVE HEART FAILURE (HCC): ICD-10-CM

## 2017-01-19 DIAGNOSIS — E78.2 MIXED HYPERLIPIDEMIA: ICD-10-CM

## 2017-01-19 PROCEDURE — 99213 OFFICE O/P EST LOW 20 MIN: CPT | Performed by: INTERNAL MEDICINE

## 2017-01-19 RX ORDER — POTASSIUM CHLORIDE 20 MEQ/1
20 TABLET, EXTENDED RELEASE ORAL
Qty: 90 TABLET | Refills: 3 | Status: SHIPPED | OUTPATIENT
Start: 2017-01-19 | End: 2018-01-01 | Stop reason: SDUPTHER

## 2017-01-19 NOTE — MR AVS SNAPSHOT
Jacob Rivers   1/19/2017 10:45 AM   Office Visit    Dept Phone:  [phone]   Encounter #:  [id number]    Provider:  Logan Mcdonald MD   Department:  [facility] CARDIOLOGY                Your Full Care Plan              Today's Medication Changes          These changes are accurate as of: 1/19/17 11:08 AM.  If you have any questions, ask your nurse or doctor.               Stop taking medication(s)listed here:     azithromycin 250 MG tablet   Commonly known as:  ZITHROMAX Z-ALBIN   Stopped by:  Logan Mcdonald MD           predniSONE 20 MG tablet   Commonly known as:  DELTASONE   Stopped by:  Logan Mcdonald MD                      Your Updated Medication List          This list is accurate as of: 1/19/17 11:08 AM.  Always use your most recent med list.                * albuterol 108 (90 BASE) MCG/ACT inhaler   Commonly known as:  PROVENTIL HFA;VENTOLIN HFA       * albuterol (2.5 MG/3ML) 0.083% nebulizer solution   Commonly known as:  PROVENTIL   Take 2.5 mg by nebulization Every 4 (Four) Hours As Needed for wheezing.       aspirin 81 MG tablet       carvedilol 3.125 MG tablet   Commonly known as:  COREG       cetirizine 10 MG tablet   Commonly known as:  zyrTEC       cyanocobalamin 1000 MCG tablet   Commonly known as:  VITAMIN B-12       enalapril 5 MG tablet   Commonly known as:  VASOTEC       ferrous sulfate 325 (65 FE) MG tablet       fluticasone 50 MCG/ACT nasal spray   Commonly known as:  FLONASE       folic acid 1 MG tablet   Commonly known as:  FOLVITE       furosemide 40 MG tablet   Commonly known as:  LASIX       levothyroxine 75 MCG tablet   Commonly known as:  SYNTHROID, LEVOTHROID       montelukast 10 MG tablet   Commonly known as:  SINGULAIR   Take 1 tablet by mouth every night. Take one tablet at bedtime for seasonal allergies.       omeprazole 20 MG capsule   Commonly known as:  priLOSEC       potassium chloride 20 MEQ CR tablet   Commonly known as:   K-MEHUL,CARLOSOR-REN       rosuvastatin 40 MG tablet   Commonly known as:  CRESTOR       tamsulosin 0.4 MG capsule 24 hr capsule   Commonly known as:  FLOMAX       tiotropium 18 MCG per inhalation capsule   Commonly known as:  SPIRIVA       traMADol 50 MG tablet   Commonly known as:  ULTRAM       valACYclovir 1000 MG tablet   Commonly known as:  VALTREX       * Notice:  This list has 2 medication(s) that are the same as other medications prescribed for you. Read the directions carefully, and ask your doctor or other care provider to review them with you.            You Were Diagnosed With        Codes Comments    Coronary artery disease involving native coronary artery of native heart without angina pectoris    -  Primary ICD-10-CM: I25.10  ICD-9-CM: 414.01     Chronic systolic congestive heart failure     ICD-10-CM: I50.22  ICD-9-CM: 428.22, 428.0     Mixed hyperlipidemia     ICD-10-CM: E78.2  ICD-9-CM: 272.2       Instructions     None    Patient Instructions History      Upcoming Appointments     Visit Type Date Time Department    FOLLOW UP 1/19/2017 10:45 AM [facility] [place]    SUBSEQUENT MEDICARE WELLNESS 1/31/2017 10:30 AM [facility]    FOLLOW UP 4/3/2017  9:00 AM [facility]    FOLLOW UP 4/13/2017  9:00 AM [facility]    FOLLOW UP 7/20/2017 11:30 AM [facility]      MyChart Signup     Our records indicate that you have declined [facility] [employer] signup. If you would like to sign up for [employer], please email [email or web address] or call [phone] to obtain an activation code.             Other Info from Your Visit           Your Appointments     Jan 31, 2017 10:30 AM EST   Subsequent Medicare Wellness with Lacie Ernst MD   [facility] PRIMARY CARE (--)    [address]  [place] [address]   [phone]            Apr 03, 2017  9:00 AM EDT   Follow Up with Lacie Ernst MD   [facility] PRIMARY CARE (--)    [address]  [place] [address]  "  [phone]           Arrive 15 minutes prior to appointment.            Apr 13, 2017  9:00 AM EDT   Follow Up with Lacie Ernst MD   [facility] PRIMARY CARE (--)    [address] [facility] [address] [phone]           Arrive 15 minutes prior to appointment.            Jul 20, 2017 11:30 AM EDT   Follow Up with Logan Mcdonald MD   [facility] CARDIOLOGY (--)    [address] [address] [phone]           Arrive 15 minutes prior to appointment.              Allergies     Penicillins        Reason for Visit     Follow-up 6 month    Coronary Artery Disease     Congestive Heart Failure     Hypertension     Hyperlipidemia     Diabetes     Shortness of Breath     Edema     Palpitations     Dizziness           Vital Signs     Blood Pressure Pulse Respirations Height Weight Oxygen Saturation    122/62 (BP Location: Right arm, Patient Position: Sitting, Cuff Size: Adult) 88 16 72\" (182.9 cm) 200 lb 6.4 oz (90.9 kg) 98%    Body Mass Index Smoking Status                27.18 kg/m2 Former Smoker          Problems and Diagnoses Noted     Heart failure    Coronary artery disease involving native coronary artery of native heart without angina pectoris    -  Primary    Mixed hyperlipidemia            "

## 2017-01-19 NOTE — PROGRESS NOTES
[facility]  Office Progress Note  Jacob Rivers  1950  [phone]      Visit Date: 01/19/2017    PCP: [address]  [place] [address]    IDENTIFICATION: A 66 y.o. male  originally from [place]     CHF  2009 CABG X 2 [employer]  2013 echo ef 45%  6/14 proBNP 908 BUN 17/1.0, K 3.3  6/16 Echo EF 40%  Lung CA  1997 L pneumonectomy XRT - CAmp  4/14 CTA benign  HTN  HL  1/16 147/97/52/75  Cramps 2014   1/16 AAA screen neg    Chief Complaint   Patient presents with   • Follow-up     6 month   • Coronary Artery Disease   • Congestive Heart Failure   • Hypertension   • Hyperlipidemia   • Diabetes   • Shortness of Breath   • Edema   • Palpitations   • Dizziness           Allergies  Allergies   Allergen Reactions   • Penicillins        Current Medications    Current Outpatient Prescriptions:   •  albuterol (PROVENTIL HFA;VENTOLIN HFA) 108 (90 BASE) MCG/ACT inhaler, INHALE 2 PUFFS EVERY 4 HOURS AS NEEDED., Disp: , Rfl:   •  albuterol (PROVENTIL) (2.5 MG/3ML) 0.083% nebulizer solution, Take 2.5 mg by nebulization Every 4 (Four) Hours As Needed for wheezing., Disp: 90 mL, Rfl: 1  •  aspirin 81 MG tablet, Take 1 tablet by mouth daily., Disp: , Rfl:   •  carvedilol (COREG) 3.125 MG tablet, Take 1 tablet by mouth 2 (two) times a day with meals., Disp: , Rfl:   •  cetirizine (ZyrTEC) 10 MG tablet, Take 1 tablet by mouth daily., Disp: , Rfl:   •  cyanocobalamin (VITAMIN B-12) 1000 MCG tablet, Take 1 tablet by mouth 1 (one) time daily., Disp: , Rfl:   •  enalapril (VASOTEC) 5 MG tablet, Take 1 tablet by mouth 1 (one) time daily., Disp: , Rfl:   •  ferrous sulfate 325 (65 FE) MG tablet, Take 1 tablet by mouth 1 (one) time daily., Disp: , Rfl:   •  fluticasone (FLONASE) 50 MCG/ACT nasal spray, 2 sprays into each nostril Daily., Disp: , Rfl:   •  folic acid (FOLVITE) 1 MG tablet, Take 1 tablet by mouth daily., Disp: , Rfl:   •  furosemide (LASIX) 40 MG tablet, Take 1 tablet by mouth  "1 (one) time daily., Disp: , Rfl:   •  levothyroxine (SYNTHROID, LEVOTHROID) 75 MCG tablet, Take 1 tablet by mouth 1 (one) time daily., Disp: , Rfl:   •  montelukast (SINGULAIR) 10 MG tablet, Take 1 tablet by mouth every night. Take one tablet at bedtime for seasonal allergies., Disp: 30 tablet, Rfl: 11  •  omeprazole (PriLOSEC) 20 MG capsule, Take 2 capsules by mouth daily., Disp: , Rfl:   •  potassium chloride (K-DUR,KLOR-CON) 20 MEQ CR tablet, Take 1 tablet by mouth 1 (one) time daily., Disp: , Rfl:   •  rosuvastatin (CRESTOR) 40 MG tablet, Take 1 tablet by mouth daily., Disp: , Rfl:   •  tamsulosin (FLOMAX) 0.4 MG capsule 24 hr capsule, Take 1 capsule by mouth Every Night., Disp: , Rfl:   •  tiotropium (SPIRIVA) 18 MCG per inhalation capsule, daily. Inhale one dose using the handihaler device., Disp: , Rfl:   •  traMADol (ULTRAM) 50 MG tablet, Take 1 tablet by mouth 4 (four) times a day as needed., Disp: , Rfl:   •  valACYclovir (VALTREX) 1000 MG tablet, Take 2 tablets by mouth 1 (time) a day as needed for cold sore outbreaks., Disp: , Rfl:       History of Present Illness     Pt denies any chest pain,  orthopnea, PND, palpitations, lower extremity edema.  Recent pneumonitis that responded to steroids.    ROS:  All systems have been reviewed and are negative with the exception of those mentioned in the HPI.    OBJECTIVE:  Vitals:    01/19/17 1047   BP: 122/62   BP Location: Right arm   Patient Position: Sitting   Cuff Size: Adult   Pulse: 88   Resp: 16   SpO2: 98%   Weight: 200 lb 6.4 oz (90.9 kg)   Height: 72\" (182.9 cm)     Physical Exam   Constitutional: He appears well-developed and well-nourished.   Neck: Normal range of motion. Neck supple. No hepatojugular reflux and no JVD present. Carotid bruit is not present. No tracheal deviation present. No thyromegaly present.   Cardiovascular: Normal rate, regular rhythm, S1 normal, S2 normal, intact distal pulses and normal pulses.  PMI is not displaced.  Exam " reveals no gallop, no distant heart sounds, no friction rub, no midsystolic click and no opening snap.    Murmur heard.  Pulses:       Radial pulses are 2+ on the right side, and 2+ on the left side.        Dorsalis pedis pulses are 2+ on the right side, and 2+ on the left side.        Posterior tibial pulses are 2+ on the right side, and 2+ on the left side.   Pulmonary/Chest: Effort normal and breath sounds normal. He has no wheezes. He has no rales.   Abdominal: Soft. Bowel sounds are normal. He exhibits no mass. There is no tenderness. There is no guarding.       Diagnostic Data:  Procedures      ASSESSMENT:   Diagnosis Plan   1. Coronary artery disease involving native coronary artery of native heart without angina pectoris     2. Chronic systolic congestive heart failure     3. Mixed hyperlipidemia         PLAN:  Chronic ischemic systolic CHF asymptomatic NYHA 3 CHF at current. Given low normal blood pressures and occasional presyncope reticent to initiate Entresto and/or spironolactone     Hypertension controlled on current regimen     Dyslipidemian statin therapy     EF 40% consider ICD therapy as approaches MADIT criteria    Lacie Ernst MD, thank you for referring Mr. Rivers for evaluation.  I have forwarded my electronically generated recommendations to you for review.  Please do not hesitate to call with any questions.      Logan Mcdonald MD, [facility]

## 2017-01-25 ENCOUNTER — TELEPHONE (OUTPATIENT)
Dept: FAMILY MEDICINE CLINIC | Facility: CLINIC | Age: 67
End: 2017-01-25

## 2017-01-25 NOTE — TELEPHONE ENCOUNTER
----- Message from Juju Norris MA sent at 1/23/2017  3:32 PM EST -----  L/m for pt to call  ----- Message -----     From: Lacie Ernst MD     Sent: 1/10/2017   4:35 PM       To: Juju Norris MA    Please inform pt his labs show:  1) severe vit D def; needs 5000 units of vit d per day; I have sent rx to pharmacy  2) he has gotten more anemic once again; we will need to check iron level at future visit. He needs to increase iron rich foods.  3) his carbon dioxide level was high; check on pulmonary status- is he improving?

## 2017-01-27 ENCOUNTER — TELEPHONE (OUTPATIENT)
Dept: FAMILY MEDICINE CLINIC | Facility: CLINIC | Age: 67
End: 2017-01-27

## 2017-01-31 ENCOUNTER — OFFICE VISIT (OUTPATIENT)
Dept: FAMILY MEDICINE CLINIC | Facility: CLINIC | Age: 67
End: 2017-01-31

## 2017-01-31 VITALS
HEART RATE: 88 BPM | DIASTOLIC BLOOD PRESSURE: 62 MMHG | OXYGEN SATURATION: 97 % | BODY MASS INDEX: 27.58 KG/M2 | HEIGHT: 71 IN | WEIGHT: 197 LBS | SYSTOLIC BLOOD PRESSURE: 108 MMHG

## 2017-01-31 DIAGNOSIS — R26.9 GAIT DISTURBANCE: ICD-10-CM

## 2017-01-31 DIAGNOSIS — D50.8 OTHER IRON DEFICIENCY ANEMIA: ICD-10-CM

## 2017-01-31 DIAGNOSIS — Z00.00 MEDICARE ANNUAL WELLNESS VISIT, SUBSEQUENT: Primary | ICD-10-CM

## 2017-01-31 DIAGNOSIS — R42 DIZZINESS: ICD-10-CM

## 2017-01-31 DIAGNOSIS — N13.9 LOWER URINARY OBSTRUCTIVE SYMPTOM: ICD-10-CM

## 2017-01-31 LAB
IRON 24H UR-MRATE: 110 MCG/DL (ref 50–175)
IRON SATN MFR SERPL: 39 % (ref 20–50)
TIBC SERPL-MCNC: 285 MCG/DL (ref 250–450)

## 2017-01-31 PROCEDURE — 36415 COLL VENOUS BLD VENIPUNCTURE: CPT | Performed by: FAMILY MEDICINE

## 2017-01-31 PROCEDURE — 83550 IRON BINDING TEST: CPT | Performed by: FAMILY MEDICINE

## 2017-01-31 PROCEDURE — 83540 ASSAY OF IRON: CPT | Performed by: FAMILY MEDICINE

## 2017-01-31 PROCEDURE — 99213 OFFICE O/P EST LOW 20 MIN: CPT | Performed by: FAMILY MEDICINE

## 2017-01-31 PROCEDURE — G0439 PPPS, SUBSEQ VISIT: HCPCS | Performed by: FAMILY MEDICINE

## 2017-01-31 NOTE — PROGRESS NOTES
QUICK REFERENCE INFORMATION:  The ABCs of the Annual Wellness Visit    Subsequent Medicare Wellness Visit    HEALTH RISK ASSESSMENT    Recent Hospitalizations:  No recent hospitalization(s)..        Current Medical Providers:  Patient Care Team:  Lacie Ernst MD as PCP - General  Logan Mcdonald MD as Consulting Physician (Cardiology)  Doron iFnley MD as Consulting Physician (Pulmonary Disease)  Ree Chino MD as Surgeon (General Surgery)        Smoking Status:  History   Smoking Status   • Former Smoker   Smokeless Tobacco   • Not on file       Alcohol Consumption:  History   Alcohol Use No       Depression Screen:   PHQ-9 Depression Screening 1/31/2017   Little interest or pleasure in doing things 0   Feeling down, depressed, or hopeless 1   Trouble falling or staying asleep, or sleeping too much 1   Feeling tired or having little energy 1   Poor appetite or overeating 0   Feeling bad about yourself - or that you are a failure or have let yourself or your family down 1   Trouble concentrating on things, such as reading the newspaper or watching television 0   Moving or speaking so slowly that other people could have noticed. Or the opposite - being so fidgety or restless that you have been moving around a lot more than usual 0   Thoughts that you would be better off dead, or of hurting yourself in some way 0   PHQ-9 Total Score 4   If you checked off any problems, how difficult have these problems made it for you to do your work, take care of things at home, or get along with other people? Somewhat difficult       Health Habits and Functional and Cognitive Screening:  Functional & Cognitive Status 1/31/2017   Do you have difficulty preparing food and eating? No   Do you have difficulty bathing yourself? No   Do you have difficulty getting dressed? No   Do you have difficulty using the toilet? No   Do you have difficulty moving around from place to place? No   In the past year have you  fallen or experienced a near fall? No   Do you need help using the phone?  No   Are you deaf or do you have serious difficulty hearing?  Yes   Do you need help with transportation? No   Do you need help shopping? No   Do you need help preparing meals?  No   Do you need help with housework?  No   Do you need help with laundry? No   Do you need help taking your medications? No   Do you need help managing money? No   Do you have difficulty concentrating, remembering or making decisions? No              Does the patient have evidence of cognitive impairment? No    Asprin use counseling:yes    Finger Rub Hearing Test (right ear):borderline  Finger Rub Hearing Test (left ear):borderline    Recent Lab Results:  CMP:  Lab Results   Component Value Date    BUN 16 01/06/2017    CREATININE 1.00 01/06/2017    EGFRIFNONA 75 01/06/2017    BCR 16.0 01/06/2017     01/06/2017    K 4.1 01/06/2017    CO2 37.0 (H) 01/06/2017    CALCIUM 9.5 01/06/2017    ALBUMIN 3.90 01/06/2017    LABIL2 1.3 (L) 01/06/2017    BILITOT 0.7 01/06/2017    ALKPHOS 87 01/06/2017    AST 11 01/06/2017    ALT 7 01/06/2017     Lipid Panel:  Lab Results   Component Value Date    CHLPL 147 01/19/2016    TRIG 71 01/06/2017    HDL 54 01/06/2017     LDL:     HbA1c:  Lab Results   Component Value Date    HGBA1C 5.60 01/06/2017     Urine Microalbumin:  Lab Results   Component Value Date    MICROALBUR 15.3 08/19/2015     Visual Acuity:  No exam data present   Pt declines as he has had eye exam in past 6 months    Age-appropriate Screening Schedule:  Refer to the list below for future screening recommendations based on patient's age, sex and/or medical conditions. Orders for these recommended tests are listed in the plan section. The patient has been provided with a written plan.    Health Maintenance   Topic Date Due   • ZOSTER VACCINE  04/22/2016   • DIABETIC FOOT EXAM  10/21/2016   • DIABETIC EYE EXAM  10/21/2016   • URINE MICROALBUMIN  10/21/2016   • HEMOGLOBIN  A1C  07/06/2017   • LIPID PANEL  01/06/2018   • COLONOSCOPY  01/01/2024   • TDAP/TD VACCINES (2 - Td) 02/17/2024   • INFLUENZA VACCINE  Completed   • PNEUMOCOCCAL VACCINES (65+ LOW/MEDIUM RISK)  Addressed        Subjective   History of Present Illness    Jacob Rivers is a 66 y.o. male who presents for an Subsequent Wellness Visit. In addition, we addressed the following health issues: dizziness upon standing, anemia with iron deficiency.  He has a h/o mild chronic anemia assoc'd with low iron. Admits since having his dental procedures he has not been eating as much meat. C/O fatigue, dizziness.  No pica behavior. UTD on colonoscopy. No blood in stool.  No abd pain. Increased dizziness if he stands too quickly.  Otherwise no aggravating or relieving factors noted.  No CP, palpitations. Has known dysrhythmia which Dr. Mcdonald is following closely.    He also mentions urinary frequency, weak stream, hesitance. Last STEVE few years ago. Has not been to see urology. No dysuria or blood in urine.    The following portions of the patient's history were reviewed and updated as appropriate: allergies, current medications, past family history, past medical history, past social history, past surgical history and problem list.    Outpatient Medications Prior to Visit   Medication Sig Dispense Refill   • albuterol (PROVENTIL HFA;VENTOLIN HFA) 108 (90 BASE) MCG/ACT inhaler INHALE 2 PUFFS EVERY 4 HOURS AS NEEDED.     • albuterol (PROVENTIL) (2.5 MG/3ML) 0.083% nebulizer solution Take 2.5 mg by nebulization Every 4 (Four) Hours As Needed for wheezing. 90 mL 1   • aspirin 81 MG tablet Take 1 tablet by mouth daily.     • carvedilol (COREG) 3.125 MG tablet Take 1 tablet by mouth 2 (two) times a day with meals.     • cetirizine (ZyrTEC) 10 MG tablet Take 1 tablet by mouth daily.     • Cholecalciferol (VITAMIN D3) 5000 UNITS tablet Take 5,000 Units by mouth Daily. 30 tablet 5   • cyanocobalamin (VITAMIN B-12) 1000 MCG tablet Take 1  tablet by mouth 1 (one) time daily.     • enalapril (VASOTEC) 5 MG tablet Take 1 tablet by mouth 1 (one) time daily.     • ferrous sulfate 325 (65 FE) MG tablet Take 1 tablet by mouth 1 (one) time daily.     • fluticasone (FLONASE) 50 MCG/ACT nasal spray 2 sprays into each nostril Daily.     • folic acid (FOLVITE) 1 MG tablet Take 1 tablet by mouth daily.     • furosemide (LASIX) 40 MG tablet Take 1 tablet by mouth 1 (one) time daily.     • levothyroxine (SYNTHROID, LEVOTHROID) 75 MCG tablet Take 1 tablet by mouth 1 (one) time daily.     • montelukast (SINGULAIR) 10 MG tablet Take 1 tablet by mouth every night. Take one tablet at bedtime for seasonal allergies. 30 tablet 11   • omeprazole (PriLOSEC) 20 MG capsule Take 2 capsules by mouth daily.     • potassium chloride (K-DUR,KLOR-CON) 20 MEQ CR tablet Take 1 tablet by mouth Daily. 90 tablet 3   • rosuvastatin (CRESTOR) 40 MG tablet Take 1 tablet by mouth daily.     • tamsulosin (FLOMAX) 0.4 MG capsule 24 hr capsule Take 1 capsule by mouth Every Night.     • tiotropium (SPIRIVA) 18 MCG per inhalation capsule daily. Inhale one dose using the handihaler device.     • traMADol (ULTRAM) 50 MG tablet Take 1 tablet by mouth 4 (four) times a day as needed.     • valACYclovir (VALTREX) 1000 MG tablet Take 2 tablets by mouth 1 (time) a day as needed for cold sore outbreaks.       No facility-administered medications prior to visit.        Patient Active Problem List   Diagnosis   • Arthritis   • Coronary arteriosclerosis in native artery   • Chronic obstructive pulmonary disease   • Congestive heart failure   • Diabetes mellitus   • Gastroesophageal reflux disease   • Hypercholesterolemia   • Hyperlipidemia   • Hypertension   • Hypothyroidism   • Vitamin D deficiency   • Allergic rhinitis   • Abnormal chest x-ray   • Left scapholunate ligament tear   • Chronic pain of left wrist   • Osteoarthritis of left wrist   • Carpal tunnel syndrome of left wrist   • Nasal septal  deviation   • Eye exam, routine   • Eye exam, routine       Identification of Risk Factors:  Risk factors include: cardiovascular risk and polypharmacy.    Review of Systems   Constitutional: Positive for fatigue. Negative for activity change, appetite change, diaphoresis, fever and unexpected weight change.   HENT: Positive for hearing loss (chronic). Negative for congestion, mouth sores, nosebleeds, rhinorrhea, sinus pressure, sneezing, sore throat and trouble swallowing.    Eyes: Negative.  Negative for visual disturbance.   Respiratory: Negative for cough, shortness of breath and wheezing.    Cardiovascular: Negative.  Negative for chest pain, palpitations and leg swelling.   Gastrointestinal: Negative.  Negative for abdominal pain, blood in stool, diarrhea, nausea and vomiting.   Endocrine: Negative.  Negative for polydipsia and polyuria.   Genitourinary: Negative.  Negative for dysuria and hematuria.   Musculoskeletal: Positive for arthralgias and joint swelling.   Skin: Negative for rash and wound.             Neurological: Positive for dizziness. Negative for tremors, syncope, weakness and headaches.   Hematological: Negative.    Psychiatric/Behavioral: Negative.  Negative for confusion, dysphoric mood and sleep disturbance. The patient is not nervous/anxious.      Compared to one year ago, the patient feels his physical health is the same.  Compared to one year ago, the patient feels his mental health is the same.    Objective     Physical Exam   Constitutional: He is oriented to person, place, and time. He appears well-developed and well-nourished. He is cooperative. He does not appear ill. No distress.   HENT:   Mouth/Throat: Oropharynx is clear and moist and mucous membranes are normal. Mucous membranes are not dry. Abnormal dentition (edentulous).   Eyes: Conjunctivae, EOM and lids are normal.   Cardiovascular: Normal rate and intact distal pulses.  A regularly irregular rhythm present.   Murmur  "heard.   Systolic murmur is present with a grade of 2/6   Pulmonary/Chest: Effort normal. No respiratory distress. He has decreased breath sounds in the left upper field, the left middle field and the left lower field. He has no wheezes. He has no rhonchi. He has no rales.       Vascular Status -  His exam exhibits no right foot edema. His exam exhibits no left foot edema.  Neurological: He is alert and oriented to person, place, and time. He has normal strength. He displays no tremor. No cranial nerve deficit.   Get up and go test <10 secs   Skin: Skin is warm and dry. No bruising and no rash noted.   Psychiatric: He has a normal mood and affect. His speech is normal and behavior is normal. Thought content normal. Cognition and memory are normal.   Nursing note and vitals reviewed.      Vitals:    01/31/17 1033   BP: 124/72   Pulse: 76   SpO2: 97%   Weight: 197 lb (89.4 kg)   Height: 70.5\" (179.1 cm)   PainSc:   4   PainLoc: Back       Body mass index is 27.87 kg/(m^2).  Discussed the patient's BMI with him. The BMI is in the acceptable range.    Assessment/Plan   Patient Self-Management and Personalized Health Advice  The patient has been provided with information about: diet, exercise, weight management, prevention of cardiac or vascular disease, fall prevention, designing advance directives and mental health concerns and preventive services including:   · Advanced directives: has NO advanced directive - information provided to the patient today, Alcohol use counseling completed, Exercise counseling provided, Fall Risk assessment done, Fall Risk plan of care done, Nutrition counseling provided, Prostate cancer screening discussed, Zostavax vaccine (Herpes Zoster). [facility].    Visit Diagnoses:    ICD-10-CM ICD-9-CM   1. Medicare annual wellness visit, subsequent Z00.00 V70.0   2. Other iron deficiency anemia D50.8    3. Dizziness R42 780.4   4. Gait disturbance R26.9 781.2 "   5. Lower urinary obstructive symptom N13.9 599.60       Orders Placed This Encounter   Procedures   • Iron Profile   • Ambulatory Referral to Urology     Referral Priority:   Routine     Referral Type:   Consultation     Referral Reason:   Specialty Services Required     Requested Specialty:   Urology     Number of Visits Requested:   1       Outpatient Encounter Prescriptions as of 1/31/2017   Medication Sig Dispense Refill   • albuterol (PROVENTIL HFA;VENTOLIN HFA) 108 (90 BASE) MCG/ACT inhaler INHALE 2 PUFFS EVERY 4 HOURS AS NEEDED.     • albuterol (PROVENTIL) (2.5 MG/3ML) 0.083% nebulizer solution Take 2.5 mg by nebulization Every 4 (Four) Hours As Needed for wheezing. 90 mL 1   • aspirin 81 MG tablet Take 1 tablet by mouth daily.     • carvedilol (COREG) 3.125 MG tablet Take 1 tablet by mouth 2 (two) times a day with meals.     • cetirizine (ZyrTEC) 10 MG tablet Take 1 tablet by mouth daily.     • Cholecalciferol (VITAMIN D3) 5000 UNITS tablet Take 5,000 Units by mouth Daily. 30 tablet 5   • cyanocobalamin (VITAMIN B-12) 1000 MCG tablet Take 1 tablet by mouth 1 (one) time daily.     • enalapril (VASOTEC) 5 MG tablet Take 1 tablet by mouth 1 (one) time daily.     • ferrous sulfate 325 (65 FE) MG tablet Take 1 tablet by mouth 1 (one) time daily.     • fluticasone (FLONASE) 50 MCG/ACT nasal spray 2 sprays into each nostril Daily.     • folic acid (FOLVITE) 1 MG tablet Take 1 tablet by mouth daily.     • furosemide (LASIX) 40 MG tablet Take 1 tablet by mouth 1 (one) time daily.     • levothyroxine (SYNTHROID, LEVOTHROID) 75 MCG tablet Take 1 tablet by mouth 1 (one) time daily.     • montelukast (SINGULAIR) 10 MG tablet Take 1 tablet by mouth every night. Take one tablet at bedtime for seasonal allergies. 30 tablet 11   • omeprazole (PriLOSEC) 20 MG capsule Take 2 capsules by mouth daily.     • potassium chloride (K-DUR,KLOR-CON) 20 MEQ CR tablet Take 1 tablet by mouth Daily. 90 tablet 3   • rosuvastatin (CRESTOR)  40 MG tablet Take 1 tablet by mouth daily.     • tamsulosin (FLOMAX) 0.4 MG capsule 24 hr capsule Take 1 capsule by mouth Every Night.     • tiotropium (SPIRIVA) 18 MCG per inhalation capsule daily. Inhale one dose using the handihaler device.     • traMADol (ULTRAM) 50 MG tablet Take 1 tablet by mouth 4 (four) times a day as needed.     • valACYclovir (VALTREX) 1000 MG tablet Take 2 tablets by mouth 1 (time) a day as needed for cold sore outbreaks.       No facility-administered encounter medications on file as of 1/31/2017.        Reviewed use of high risk medication in the elderly: yes  Reviewed for potential of harmful drug interactions in the elderly: yes    Follow Up:  Return for Next scheduled follow up.   Annual Wellness Visit in 1 year.  I will contact patient regarding test results and provide instructions regarding any necessary changes in plan of care.  F/U with Dr. Mcdonald as and Dr. Suazo as scheduled.  Continue current medications.  [facility]  Patient voiced understanding of all instructions and denied further questions.  An After Visit Summary and PPPS with all of these plans were given to the patient.

## 2017-01-31 NOTE — PATIENT INSTRUCTIONS
Medicare Wellness  Personal Prevention Plan of Service     Date of Office Visit:  2017  Encounter Provider:  Lacie Ernst MD  Place of Service:  [facility]  Patient Name: Jacob Rivers  :  1950    As part of the Medicare Wellness portion of your visit today, we are providing you with this personalized preventive plan of services (PPPS). This plan is based upon recommendations of the [place] Preventive Services Task Force (USPSTF) and the Advisory Committee on Immunization Practices (ACIP).    This lists the preventive care services that should be considered, and provides dates of when you are due. Items listed as completed are up-to-date and do not require any further intervention.    Health Maintenance   Topic Date Due   • ZOSTER VACCINE  2016   • DIABETIC FOOT EXAM  10/21/2016   • DIABETIC EYE EXAM  10/21/2016   • URINE MICROALBUMIN  10/21/2016   • HEMOGLOBIN A1C  2017   • LIPID PANEL  2018   • MEDICARE ANNUAL WELLNESS  2018   • COLONOSCOPY  2024   • TDAP/TD VACCINES (2 - Td) 2024   • HEPATITIS C SCREENING  Completed   • INFLUENZA VACCINE  Completed   • PNEUMOCOCCAL VACCINES (65+ LOW/MEDIUM RISK)  Addressed   • AAA SCREEN (ONE-TIME)  Addressed         Patient Self-Management and Personalized Health Advice  The patient has been provided with information about: diet, exercise, weight management, prevention of cardiac or vascular disease, fall prevention, designing advance directives and mental health concerns and preventive services including:   · Advanced directives: has NO advanced directive - information provided to the patient today, Alcohol use counseling completed, Exercise counseling provided, Fall Risk assessment done, Fall Risk plan of care done, Nutrition counseling provided, Prostate cancer screening discussed, Zostavax vaccine (Herpes Zoster). [facility].    Visit Diagnoses:     ICD-10-CM ICD-9-CM   1. Medicare annual wellness visit, subsequent Z00.00 V70.0   2. Other iron deficiency anemia D50.8    3. Dizziness R42 780.4   4. Gait disturbance R26.9 781.2   5. Lower urinary obstructive symptom N13.9 599.60       Orders Placed This Encounter   Procedures   • Iron Profile   • Ambulatory Referral to Urology     Referral Priority:   Routine     Referral Type:   Consultation     Referral Reason:   Specialty Services Required     Requested Specialty:   Urology     Number of Visits Requested:   1       Outpatient Encounter Prescriptions as of 1/31/2017   Medication Sig Dispense Refill   • albuterol (PROVENTIL HFA;VENTOLIN HFA) 108 (90 BASE) MCG/ACT inhaler INHALE 2 PUFFS EVERY 4 HOURS AS NEEDED.     • albuterol (PROVENTIL) (2.5 MG/3ML) 0.083% nebulizer solution Take 2.5 mg by nebulization Every 4 (Four) Hours As Needed for wheezing. 90 mL 1   • aspirin 81 MG tablet Take 1 tablet by mouth daily.     • carvedilol (COREG) 3.125 MG tablet Take 1 tablet by mouth 2 (two) times a day with meals.     • cetirizine (ZyrTEC) 10 MG tablet Take 1 tablet by mouth daily.     • Cholecalciferol (VITAMIN D3) 5000 UNITS tablet Take 5,000 Units by mouth Daily. 30 tablet 5   • cyanocobalamin (VITAMIN B-12) 1000 MCG tablet Take 1 tablet by mouth 1 (one) time daily.     • enalapril (VASOTEC) 5 MG tablet Take 1 tablet by mouth 1 (one) time daily.     • ferrous sulfate 325 (65 FE) MG tablet Take 1 tablet by mouth 1 (one) time daily.     • fluticasone (FLONASE) 50 MCG/ACT nasal spray 2 sprays into each nostril Daily.     • folic acid (FOLVITE) 1 MG tablet Take 1 tablet by mouth daily.     • furosemide (LASIX) 40 MG tablet Take 1 tablet by mouth 1 (one) time daily.     • levothyroxine (SYNTHROID, LEVOTHROID) 75 MCG tablet Take 1 tablet by mouth 1 (one) time daily.     • montelukast (SINGULAIR) 10 MG tablet Take 1 tablet by mouth every night. Take one tablet at bedtime for seasonal allergies. 30 tablet 11   • omeprazole  (PriLOSEC) 20 MG capsule Take 2 capsules by mouth daily.     • potassium chloride (K-DUR,KLOR-CON) 20 MEQ CR tablet Take 1 tablet by mouth Daily. 90 tablet 3   • rosuvastatin (CRESTOR) 40 MG tablet Take 1 tablet by mouth daily.     • tamsulosin (FLOMAX) 0.4 MG capsule 24 hr capsule Take 1 capsule by mouth Every Night.     • tiotropium (SPIRIVA) 18 MCG per inhalation capsule daily. Inhale one dose using the handihaler device.     • traMADol (ULTRAM) 50 MG tablet Take 1 tablet by mouth 4 (four) times a day as needed.     • valACYclovir (VALTREX) 1000 MG tablet Take 2 tablets by mouth 1 (time) a day as needed for cold sore outbreaks.       No facility-administered encounter medications on file as of 1/31/2017.        Reviewed use of high risk medication in the elderly: yes  Reviewed for potential of harmful drug interactions in the elderly: yes    Follow Up:  Return for Next scheduled follow up.     An After Visit Summary and PPPS with all of these plans were given to the patient.

## 2017-02-27 PROBLEM — D51.0 PERNICIOUS ANEMIA: Status: ACTIVE | Noted: 2017-02-27

## 2017-02-27 PROBLEM — M17.11 OSTEOARTHRITIS OF RIGHT KNEE: Status: ACTIVE | Noted: 2017-02-27

## 2017-02-27 PROBLEM — N28.9 RENAL INSUFFICIENCY: Status: ACTIVE | Noted: 2017-02-27

## 2017-02-27 PROBLEM — D80.3: Status: ACTIVE | Noted: 2017-02-27

## 2017-02-27 PROBLEM — Z87.438 HISTORY OF BPH: Status: ACTIVE | Noted: 2017-02-27

## 2017-03-22 ENCOUNTER — OFFICE VISIT (OUTPATIENT)
Dept: FAMILY MEDICINE CLINIC | Facility: CLINIC | Age: 67
End: 2017-03-22

## 2017-03-22 VITALS
SYSTOLIC BLOOD PRESSURE: 128 MMHG | TEMPERATURE: 100.7 F | BODY MASS INDEX: 28.28 KG/M2 | DIASTOLIC BLOOD PRESSURE: 70 MMHG | OXYGEN SATURATION: 96 % | WEIGHT: 202 LBS | HEART RATE: 84 BPM | HEIGHT: 71 IN

## 2017-03-22 DIAGNOSIS — J44.1 ACUTE EXACERBATION OF CHRONIC OBSTRUCTIVE PULMONARY DISEASE (COPD) (HCC): Primary | ICD-10-CM

## 2017-03-22 DIAGNOSIS — J20.8 ACUTE BRONCHITIS DUE TO OTHER SPECIFIED ORGANISMS: ICD-10-CM

## 2017-03-22 DIAGNOSIS — J30.89 OTHER ALLERGIC RHINITIS: ICD-10-CM

## 2017-03-22 DIAGNOSIS — R50.81 FEVER IN OTHER DISEASES: ICD-10-CM

## 2017-03-22 LAB
EXPIRATION DATE: NORMAL
FLUAV AG NPH QL: NORMAL
FLUBV AG NPH QL: NORMAL
INTERNAL CONTROL: NORMAL
Lab: NORMAL

## 2017-03-22 PROCEDURE — 99214 OFFICE O/P EST MOD 30 MIN: CPT | Performed by: FAMILY MEDICINE

## 2017-03-22 PROCEDURE — 87804 INFLUENZA ASSAY W/OPTIC: CPT | Performed by: FAMILY MEDICINE

## 2017-03-22 PROCEDURE — 96372 THER/PROPH/DIAG INJ SC/IM: CPT | Performed by: FAMILY MEDICINE

## 2017-03-22 RX ORDER — CEFTRIAXONE 1 G/1
1 INJECTION, POWDER, FOR SOLUTION INTRAMUSCULAR; INTRAVENOUS ONCE
Status: COMPLETED | OUTPATIENT
Start: 2017-03-22 | End: 2017-03-22

## 2017-03-22 RX ORDER — MONTELUKAST SODIUM 10 MG/1
10 TABLET ORAL NIGHTLY
Qty: 30 TABLET | Refills: 5 | Status: SHIPPED | OUTPATIENT
Start: 2017-03-22 | End: 2017-01-01 | Stop reason: SDUPTHER

## 2017-03-22 RX ORDER — METHYLPREDNISOLONE ACETATE 80 MG/ML
120 INJECTION, SUSPENSION INTRA-ARTICULAR; INTRALESIONAL; INTRAMUSCULAR; SOFT TISSUE ONCE
Status: COMPLETED | OUTPATIENT
Start: 2017-03-22 | End: 2017-03-22

## 2017-03-22 RX ORDER — AZITHROMYCIN 250 MG/1
TABLET, FILM COATED ORAL
Qty: 6 TABLET | Refills: 0 | Status: SHIPPED | OUTPATIENT
Start: 2017-03-22 | End: 2017-04-03

## 2017-03-22 RX ORDER — PREDNISONE 20 MG/1
20 TABLET ORAL 2 TIMES DAILY
Qty: 10 TABLET | Refills: 0 | Status: SHIPPED | OUTPATIENT
Start: 2017-03-22 | End: 2017-04-03

## 2017-03-22 RX ADMIN — METHYLPREDNISOLONE ACETATE 120 MG: 80 INJECTION, SUSPENSION INTRA-ARTICULAR; INTRALESIONAL; INTRAMUSCULAR; SOFT TISSUE at 14:05

## 2017-03-22 RX ADMIN — CEFTRIAXONE 1 G: 1 INJECTION, POWDER, FOR SOLUTION INTRAMUSCULAR; INTRAVENOUS at 14:04

## 2017-03-22 NOTE — PROGRESS NOTES
Subjective   Jacob Rivers is a 67 y.o. male.     Fever    This is a new problem. The current episode started yesterday. The problem occurs constantly. The problem has been waxing and waning. The maximum temperature noted was 100 to 100.9 F. Associated symptoms include congestion, coughing (productive of sputum), headaches, a sore throat and wheezing. Pertinent negatives include no abdominal pain, chest pain, diarrhea, ear pain, muscle aches, nausea, rash, urinary pain or vomiting. He has tried nothing for the symptoms.   Risk factors: hx of cancer    Risk factors comment:  COPD    The following portions of the patient's history were reviewed and updated as appropriate: allergies, current medications, past family history, past medical history, past social history, past surgical history and problem list.    Review of Systems   Constitutional: Positive for fatigue and fever.   HENT: Positive for congestion, postnasal drip, rhinorrhea and sore throat. Negative for ear pain and mouth sores.    Eyes: Negative for pain, discharge and redness.   Respiratory: Positive for cough (productive of sputum), shortness of breath and wheezing.    Cardiovascular: Negative for chest pain, palpitations and leg swelling.   Gastrointestinal: Negative for abdominal pain, diarrhea, nausea and vomiting.   Genitourinary: Negative for dysuria and hematuria.   Musculoskeletal: Positive for arthralgias. Negative for myalgias.   Skin: Negative for rash.   Neurological: Positive for headaches. Negative for syncope and weakness.   Hematological: Negative for adenopathy. Bruises/bleeds easily.   Psychiatric/Behavioral: Positive for sleep disturbance. Negative for dysphoric mood. The patient is not nervous/anxious.      Objective    Vitals:    03/22/17 1253   BP: 128/70   Pulse: 84   Temp: (!) 100.7 °F (38.2 °C)   SpO2: 96%     Body mass index is 28.57 kg/(m^2).  Last 2 weights    03/22/17  1253   Weight: 202 lb (91.6 kg)     Physical Exam    Constitutional: He is oriented to person, place, and time. He appears well-developed and well-nourished. He is cooperative. He appears ill. No distress.   HENT:   Head: Normocephalic and atraumatic.   Right Ear: Tympanic membrane, external ear and ear canal normal. Decreased hearing is noted.   Left Ear: Tympanic membrane, external ear and ear canal normal. Decreased hearing is noted.   Nose: Mucosal edema and rhinorrhea (clear) present. Right sinus exhibits no maxillary sinus tenderness. Left sinus exhibits no maxillary sinus tenderness.   Mouth/Throat: Mucous membranes are normal. Mucous membranes are not dry. No oral lesions. No oropharyngeal exudate.   Eyes: Conjunctivae and lids are normal.   Neck: Neck supple. Normal carotid pulses present. No thyroid mass and no thyromegaly present.   Cardiovascular: Normal rate and regular rhythm.   Occasional extrasystoles are present.   Pulmonary/Chest: He has decreased breath sounds (chronic) in the left upper field, the left middle field and the left lower field. He has wheezes (scattered). He has no rhonchi. He has no rales.       Vascular Status -  His exam exhibits no right foot edema. His exam exhibits no left foot edema.  Lymphadenopathy:     He has no cervical adenopathy.   Neurological: He is alert and oriented to person, place, and time. He has normal strength. He displays no tremor. No cranial nerve deficit. Gait normal.   Skin: Skin is warm and dry. No rash noted.   Psychiatric: He has a normal mood and affect. His behavior is normal. Cognition and memory are normal.   Nursing note and vitals reviewed.    Recent Results (from the past 48 hour(s))   POC Influenza A / B    Collection Time: 03/22/17  2:46 PM   Result Value Ref Range    Rapid Influenza A Ag NEG     Rapid Influenza B Ag NEG     Internal Control Passed Passed    Lot Number [id number]     Expiration Date 12/17      Assessment/Plan   Jacob was seen today for sore throat, fever, nasal congestion and  chills.    Diagnoses and all orders for this visit:    Acute exacerbation of chronic obstructive pulmonary disease (COPD)  -     azithromycin (ZITHROMAX Z-ALBIN) 250 MG tablet; Take 2 tablets the first day, then 1 tablet daily for 4 days.  -     predniSONE (DELTASONE) 20 MG tablet; Take 1 tablet by mouth 2 (Two) Times a Day.  -     cefTRIAXone (ROCEPHIN) injection 1 g; Inject 1 g into the shoulder, thigh, or buttocks 1 (One) Time.  -     methylPREDNISolone acetate (DEPO-medrol) injection 120 mg; Inject 1.5 mL into the shoulder, thigh, or buttocks 1 (One) Time.    Other allergic rhinitis  -     montelukast (SINGULAIR) 10 MG tablet; Take 1 tablet by mouth Every Night. Take one tablet at bedtime for seasonal allergies.  -     cefTRIAXone (ROCEPHIN) injection 1 g; Inject 1 g into the shoulder, thigh, or buttocks 1 (One) Time.  -     methylPREDNISolone acetate (DEPO-medrol) injection 120 mg; Inject 1.5 mL into the shoulder, thigh, or buttocks 1 (One) Time.    Acute bronchitis due to other specified organisms  -     azithromycin (ZITHROMAX Z-ALBIN) 250 MG tablet; Take 2 tablets the first day, then 1 tablet daily for 4 days.  -     predniSONE (DELTASONE) 20 MG tablet; Take 1 tablet by mouth 2 (Two) Times a Day.  -     cefTRIAXone (ROCEPHIN) injection 1 g; Inject 1 g into the shoulder, thigh, or buttocks 1 (One) Time.  -     methylPREDNISolone acetate (DEPO-medrol) injection 120 mg; Inject 1.5 mL into the shoulder, thigh, or buttocks 1 (One) Time.    Fever in other diseases  -     cefTRIAXone (ROCEPHIN) injection 1 g; Inject 1 g into the shoulder, thigh, or buttocks 1 (One) Time.  -     methylPREDNISolone acetate (DEPO-medrol) injection 120 mg; Inject 1.5 mL into the shoulder, thigh, or buttocks 1 (One) Time.  -     POC Influenza A / B    Symptomatic tx reviewed.  Patient was encouraged to keep me informed of any acute changes, lack of improvement, or any new concerning symptoms. Update status over next 24-48 hrs.  Keep routine  f/u apt, f/u sooner as needed.  Pt is aware of reasons to seek emergent care.  Patient voiced understanding of all instructions and denied further questions.

## 2017-04-03 ENCOUNTER — OFFICE VISIT (OUTPATIENT)
Dept: FAMILY MEDICINE CLINIC | Facility: CLINIC | Age: 67
End: 2017-04-03

## 2017-04-03 VITALS
WEIGHT: 200 LBS | BODY MASS INDEX: 28 KG/M2 | OXYGEN SATURATION: 97 % | SYSTOLIC BLOOD PRESSURE: 122 MMHG | HEART RATE: 84 BPM | DIASTOLIC BLOOD PRESSURE: 84 MMHG | HEIGHT: 71 IN

## 2017-04-03 DIAGNOSIS — E78.00 HYPERCHOLESTEROLEMIA: ICD-10-CM

## 2017-04-03 DIAGNOSIS — M79.672 BILATERAL FOOT PAIN: ICD-10-CM

## 2017-04-03 DIAGNOSIS — M25.551 RIGHT HIP PAIN: ICD-10-CM

## 2017-04-03 DIAGNOSIS — E03.8 OTHER SPECIFIED HYPOTHYROIDISM: ICD-10-CM

## 2017-04-03 DIAGNOSIS — N28.9 RENAL INSUFFICIENCY: ICD-10-CM

## 2017-04-03 DIAGNOSIS — E55.9 VITAMIN D DEFICIENCY: ICD-10-CM

## 2017-04-03 DIAGNOSIS — E11.9 TYPE 2 DIABETES MELLITUS WITHOUT COMPLICATION, WITHOUT LONG-TERM CURRENT USE OF INSULIN (HCC): Primary | ICD-10-CM

## 2017-04-03 DIAGNOSIS — J44.9 CHRONIC OBSTRUCTIVE PULMONARY DISEASE, UNSPECIFIED COPD TYPE (HCC): ICD-10-CM

## 2017-04-03 DIAGNOSIS — D51.0 PERNICIOUS ANEMIA: ICD-10-CM

## 2017-04-03 DIAGNOSIS — I10 ESSENTIAL HYPERTENSION: ICD-10-CM

## 2017-04-03 DIAGNOSIS — M79.671 BILATERAL FOOT PAIN: ICD-10-CM

## 2017-04-03 PROCEDURE — 99214 OFFICE O/P EST MOD 30 MIN: CPT | Performed by: FAMILY MEDICINE

## 2017-04-03 NOTE — PROGRESS NOTES
Subjective   Jacob Rivers is a 67 y.o. male.     Foot Problem   This is a chronic (burning pain on bottom of feet) problem. The current episode started more than 1 month ago (approx 6 months). The problem occurs intermittently. The problem has been gradually worsening. Associated symptoms include arthralgias, coughing (stable), fatigue and joint swelling. Pertinent negatives include no chest pain, chills, fever, headaches, numbness, rash or weakness. Exacerbated by: taking off shoes. He has tried nothing (better with wearing shoes) for the symptoms.   Hip Pain    The incident occurred more than 1 week ago (over 6 months ago). The incident occurred at home. The injury mechanism is unknown. The pain is present in the right hip (radiated into right groin). The quality of the pain is described as aching and burning. The pain is severe. The pain has been improving since onset. Associated symptoms include a loss of motion (pain with flexion of hip). Pertinent negatives include no inability to bear weight, loss of sensation, muscle weakness or numbness. Exacerbated by: lifting leg. Treatments tried: given steroid shot for acute URI and this helped right hip. The treatment provided significant relief.     [facility] Dr. Mcdonald and Dr. Finley. He has h/o COPD/lung CA, HTN, HLP, CAD s/p CABG. Has kept regular f/u visit with Dr. Mcdonald. (Note reviewed.) Has had recent f/u with pulmonology as well. No recent changes in medications. Is taking as rx'd. Currently on statin, ASA, Ace-inh, beta blocker.     Diabetes Mellitus Type II, Follow-up: Patient here for follow-up of Type 2 diabetes mellitus.  Current symptoms/problems include paresthesia of the feet and have been worsening. Symptoms have been present for several months.  Known diabetic complications: cardiovascular disease  Cardiovascular risk factors: advanced age (older than 55 for men, 65 for women), diabetes mellitus,  "dyslipidemia, hypertension and male gender  Current diabetic medications include none.   Eye exam current (within one year): yes  Weight trend: stable  Prior visit with dietician: yes -    Current diet: in general, a \"healthy\" diet    Current exercise: walking  Current monitoring regimen: none   Any episodes of hypoglycemia? no  Is He on ACE inhibitor or angiotensin II receptor blocker?   Yes   enalapril (Vasotec)     Hypothyroidism: Patient presents for evaluation of thyroid function. Symptoms consist of fatigue, palpitations, change in skin,  nails, or hair. Symptoms have present for several years. The symptoms are mild.  The problem has been gradually improving.  Previous thyroid studies include TSH, triiodothyronine free and free thyroxine. The hypothyroidism is acquired.     The following portions of the patient's history were reviewed and updated as appropriate: allergies, current medications, past family history, past medical history, past social history, past surgical history and problem list.    Review of Systems   Constitutional: Positive for fatigue. Negative for chills, fever and unexpected weight change.   HENT: Negative.    Eyes: Negative.    Respiratory: Positive for cough (stable) and shortness of breath (stable). Negative for wheezing.    Cardiovascular: Positive for palpitations. Negative for chest pain and leg swelling.   Gastrointestinal: Negative.    Endocrine: Positive for heat intolerance.   Genitourinary: Negative for dysuria and hematuria.   Musculoskeletal: Positive for arthralgias and joint swelling.   Skin: Negative for rash.   Neurological: Negative for dizziness, syncope, weakness, numbness and headaches.   Hematological: Negative.    Psychiatric/Behavioral: Negative.        Objective    Vitals:    04/03/17 1334   BP: 122/84   Pulse: 84   SpO2: 97%     Body mass index is 28.29 kg/(m^2).  Last 2 weights    04/03/17  1334   Weight: 200 lb (90.7 kg)       Physical Exam   Constitutional: He " is oriented to person, place, and time. He appears well-developed and well-nourished. He is cooperative. He does not appear ill. No distress.   HENT:   Head: Normocephalic and atraumatic.   Right Ear: Decreased hearing is noted.   Left Ear: Decreased hearing is noted.   Mouth/Throat: Mucous membranes are normal. Mucous membranes are not dry.   Eyes: Conjunctivae and lids are normal.   Neck: Phonation normal. Neck supple. Normal carotid pulses present. No thyroid mass and no thyromegaly present.   Cardiovascular: Normal rate and intact distal pulses.  A regularly irregular rhythm present.   Murmur (stable, baseline) heard.   Systolic murmur is present with a grade of 3/6   Pulmonary/Chest: Effort normal. No respiratory distress. He has decreased breath sounds (baseline) in the left upper field, the left middle field and the left lower field. He has no wheezes. He has no rhonchi. He has no rales.   Musculoskeletal:        Right hip: He exhibits decreased range of motion (mildly decreased with flexion, neg JERMAINE) and bony tenderness (anterior TTP, mild). He exhibits normal strength and no crepitus.    Jacob had a diabetic foot exam performed today.   During the foot exam he had a monofilament test performed (normal).    Vascular Status -  His exam exhibits right foot vasculature normal. His exam exhibits no right foot edema. His exam exhibits left foot vasculature normal. His exam exhibits no left foot edema.   Skin Integrity  -  His right foot skin is intact.   He hasno right foot ulcer.    Jacob 's left foot skin is intact.  He has no left foot ulcer..  Lymphadenopathy:     He has no cervical adenopathy.   Neurological: He is alert and oriented to person, place, and time. He has normal strength. He displays no tremor. No cranial nerve deficit. Gait normal.   Skin: Skin is warm and dry. No ecchymosis and no rash noted.   Psychiatric: He has a normal mood and affect. His behavior is normal. Cognition and memory are  normal.   Nursing note and vitals reviewed.      Assessment/Plan   Jacob was seen today for foot problem and hip pain.    Diagnoses and all orders for this visit:    Type 2 diabetes mellitus without complication, without long-term current use of insulin  -     Hemoglobin A1c  -     Microalbumin / Creatinine Urine Ratio    Hypercholesterolemia    Essential hypertension  -     CBC (No Diff)  -     Microalbumin / Creatinine Urine Ratio    Chronic obstructive pulmonary disease, unspecified COPD type  -     CBC (No Diff)    Vitamin D deficiency  -     Vitamin D 25 Hydroxy    Other specified hypothyroidism    Renal insufficiency  -     CBC (No Diff)    Pernicious anemia  -     CBC (No Diff)  -     Vitamin B12    Bilateral foot pain  -     Uric Acid    Right hip pain    Right hip pain most likely due to hip flexor tendonitis. Essentially resolved s/p tx with corticosteroids.  Preventive measures reviewed. He is to report recurrence.  Bilateral foot pain of unclear etiology. Has h/o low B12, low Vit D. Unsure if h/o gout. Labs as above; tx as indicated.  Multiple chronic conditions which appear stable.  Good f/u with specialists. He is reminded to keep those.  Continue current medications.  I will contact patient regarding test results and provide instructions regarding any necessary changes in plan of care.  Follow up in 3 month(s), sooner as needed/instructed.  Patient was encouraged to keep me informed of any acute changes, lack of improvement, or any new concerning symptoms.  Patient voiced understanding of all instructions and denied further questions.

## 2017-04-04 LAB
25(OH)D3+25(OH)D2 SERPL-MCNC: 38.1 NG/ML
ALBUMIN/CREAT UR: <13.8 MG/G CREAT (ref 0–30)
CREAT UR-MCNC: 21.8 MG/DL
ERYTHROCYTE [DISTWIDTH] IN BLOOD BY AUTOMATED COUNT: 14.3 % (ref 11.5–14.5)
HBA1C MFR BLD: 6 %
HCT VFR BLD AUTO: 40.6 % (ref 42–52)
HGB BLD-MCNC: 12.8 G/DL (ref 14–18)
MCH RBC QN AUTO: 28.7 PG (ref 27–31)
MCHC RBC AUTO-ENTMCNC: 31.5 G/DL (ref 30–37)
MCV RBC AUTO: 91 FL (ref 80–94)
MICROALBUMIN UR-MCNC: <3 UG/ML
PLATELET # BLD AUTO: 313 10*3/MM3 (ref 130–400)
RBC # BLD AUTO: 4.46 10*6/MM3 (ref 4.7–6.1)
URATE SERPL-MCNC: 8.6 MG/DL (ref 2.5–8.5)
VIT B12 SERPL-MCNC: 930 PG/ML (ref 239–931)
WBC # BLD AUTO: 10.71 10*3/MM3 (ref 4.8–10.8)

## 2017-04-05 ENCOUNTER — OFFICE VISIT (OUTPATIENT)
Dept: UROLOGY | Facility: CLINIC | Age: 67
End: 2017-04-05

## 2017-04-05 VITALS
SYSTOLIC BLOOD PRESSURE: 110 MMHG | DIASTOLIC BLOOD PRESSURE: 72 MMHG | BODY MASS INDEX: 27.09 KG/M2 | WEIGHT: 200 LBS | OXYGEN SATURATION: 95 % | HEIGHT: 72 IN | HEART RATE: 104 BPM

## 2017-04-05 DIAGNOSIS — N40.0 BENIGN NON-NODULAR PROSTATIC HYPERPLASIA, PRESENCE OF LOWER URINARY TRACT SYMPTOMS UNSPECIFIED: ICD-10-CM

## 2017-04-05 DIAGNOSIS — E29.1 HYPOGONADISM IN MALE: ICD-10-CM

## 2017-04-05 DIAGNOSIS — R39.198 DIFFICULTY IN URINATION: Primary | ICD-10-CM

## 2017-04-05 LAB
BILIRUB BLD-MCNC: NEGATIVE MG/DL
CLARITY, POC: CLEAR
COLOR UR: YELLOW
GLUCOSE UR STRIP-MCNC: NEGATIVE MG/DL
KETONES UR QL: NEGATIVE
LEUKOCYTE EST, POC: NEGATIVE
NITRITE UR-MCNC: NEGATIVE MG/ML
PH UR: 5 [PH] (ref 5–8)
PROT UR STRIP-MCNC: NEGATIVE MG/DL
RBC # UR STRIP: NEGATIVE /UL
SP GR UR: 1.02 (ref 1–1.03)
UROBILINOGEN UR QL: NORMAL

## 2017-04-05 PROCEDURE — 76857 US EXAM PELVIC LIMITED: CPT | Performed by: UROLOGY

## 2017-04-05 PROCEDURE — 81003 URINALYSIS AUTO W/O SCOPE: CPT | Performed by: UROLOGY

## 2017-04-05 PROCEDURE — 99203 OFFICE O/P NEW LOW 30 MIN: CPT | Performed by: UROLOGY

## 2017-04-05 NOTE — PROGRESS NOTES
Chief Complaint  Consult (patient states he has slow urination.)        LUIS Rivers is a 67 y.o. male who referred for evaluation of voiding difficulties.  Been taking Flomax for a couple of years and on that medication his symptoms are mild describing an AUA obstructive index of only 8.  This includes nocturia ×2-3 that is associated with lower extremity edema as well as leg cramps because him to get up a lot at night.  He states after his open heart surgery he had a bout of congestive heart failure and has been taking Lasix ever since.  Family history is positive for multiple types of cancer but not prostate.  He has a Valtrex for fever blisters but denies genital herpes.  He is a former smoker and fortunately his urine today is clear and negative for blood.  He has a steady girlfriend but they are not that sexually active    Vitals:    04/05/17 1437   BP: 110/72   Pulse: 104   SpO2: 95%       Past Medical History  Past Medical History:   Diagnosis Date   • Acute combined systolic and diastolic congestive heart failure    • Anemia    • Asthma    • Community acquired pneumonia 10/21/2016   • Depression    • Epididymitis    • Exposure to Agent Orange    • Eye exam, routine 09/2016   • Herpes labialis 4/27/2016   • History of esophagogastroduodenoscopy    • Influenza B 02/11/2003   • Lung cancer     Squamous cell   • Macular degeneration    • Meniscus tear     right medial   • Renal insufficiency        Past Surgical History  Past Surgical History:   Procedure Laterality Date   • COLONOSCOPY  2013   • CORONARY ARTERY BYPASS GRAFT  05/04/2009    [facility] utilizing SVG and end anastomosis of the obtuse marginal #2. LAD and saphenous veing and end anastamosis.   • LUNG LOBECTOMY Left    • UPPER GASTROINTESTINAL ENDOSCOPY     • VASECTOMY         Medications  has a current medication list which includes the following prescription(s): albuterol, albuterol, aspirin, carvedilol, cetirizine, vitamin d3, cyanocobalamin,  enalapril, ferrous sulfate, fluticasone, folic acid, furosemide, levothyroxine, montelukast, omeprazole, potassium chloride, rosuvastatin, tamsulosin, tiotropium, tramadol, and valacyclovir.      Allergies  Allergies   Allergen Reactions   • Penicillins        Social History  Social History     Social History Narrative    Served in [id number].       Family History  He has no family history of bladder or kidney cancer  He has no family history of kidney stones      AUA Symptom Score:      Review of Systems  Review of Systems  Positive for fatigue night sweats also vision ringing in his ears hoarseness decreased hearing wheezing difficulty breathing constipation joint pain joint swelling muscle weakness dizziness depression sexual dysfunction and easy bruising.  Physical Exam  Physical Exam   Constitutional: He is oriented to person, place, and time. He appears well-developed and well-nourished.   HENT:   Head: Normocephalic and atraumatic.   Neck: Normal range of motion.   Cardiovascular: Normal rate and regular rhythm.    Pulmonary/Chest: Effort normal. No respiratory distress.   Abdominal: Soft. He exhibits no distension and no mass. There is no tenderness. No hernia. Hernia confirmed negative in the right inguinal area and confirmed negative in the left inguinal area.   Genitourinary: Rectum normal, prostate normal, testes normal and penis normal.   Musculoskeletal: Normal range of motion.   Lymphadenopathy:     He has no cervical adenopathy. No inguinal adenopathy noted on the right or left side.   Neurological: He is alert and oriented to person, place, and time.   Skin: Skin is warm and dry.   Psychiatric: He has a normal mood and affect. His behavior is normal.   Vitals reviewed.      Labs Recent and today in the office:  Results for orders placed or performed in visit on 04/05/17   POC Urinalysis Dipstick, Automated   Result Value Ref Range    Color Yellow Yellow, Straw, Dark Yellow, Peyton    Clarity, UA  Clear Clear    Glucose, UA Negative Negative, 1000 mg/dL (3+) mg/dL    Bilirubin Negative Negative    Ketones, UA Negative Negative    Specific Gravity  1.020 1.005 - 1.030    Blood, UA Negative Negative    pH, Urine 5.0 5.0 - 8.0    Protein, POC Negative Negative mg/dL    Urobilinogen, UA Normal Normal    Leukocytes Negative Negative    Nitrite, UA Negative Negative         Assessment & Plan  Digital rectal exam reveals a small benign prostate so a PSA is submitted and if normal he has little risk of prostate cancer.  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  He does have several symptoms of hypogonadism so we will check a testosterone level and if low he will be called back in to discuss supplement.  If normal he can return on an annual basis.

## 2017-04-05 NOTE — PROGRESS NOTES
[facility]- UROLOGY  [address], [address]  [place], [place] [address]  [phone]      04/05/2017    Jacob Rivers  1950        Pelvic Ultrasound with PVR    A transabdominal pelvic ultrasound was performed using a 3.5 MHz transducer of a B-K Dixon through the suprapubic area.     The purpose of the study was to investigate the patient's voiding difficulties.  There was minimal bladder wall thickening noted.  There were no intravesical filling defects seen.  The post void residual of 35.1 ml was noted.  Prostate was heterogeneous but small and was noted to be 18.9 grams.  There was a protrusion of the prostate into the bladder.  Ultrasound images will be scanned into the chart for reference.       CPT code 28405        Performed by King Ritchie MD

## 2017-04-06 PROBLEM — E79.0 ELEVATED URIC ACID IN BLOOD: Status: ACTIVE | Noted: 2017-01-01

## 2017-04-06 NOTE — TELEPHONE ENCOUNTER
Please let him know meds sent to pharmacy.  He should let us know if he has stomach problems with new meds. He should not take any additional OTC anti-inflammatories.

## 2017-04-06 NOTE — TELEPHONE ENCOUNTER
----- Message from Radha Yeboah MA sent at 4/5/2017  3:46 PM EDT -----  Pt notified of results and he stated that he does want something called in because is feet he said still feels like they are burning    ----- Message -----     From: Lacie Ernst MD     Sent: 4/5/2017   3:35 PM       To: Radha Yeboah MA    Please inform pt his recent labs showed:  1) normal kidney function  2) improved anemia  3) controlled DM with A1c of 6  4) vitamin D has come up; continue replacement  5) uric acid level is high; this suggests he may have gout; would he like to try preventive medication to prevent future flares?

## 2017-04-19 NOTE — PROGRESS NOTES
Subjective   Jacob Rivers is a 67 y.o. male.     History of Present Illness  Mr. Rivers presents today with c/o severe pain in roof of mouth and nasal cavities. Aggravated by eating, deep breathing. He reports undergoing procedure per dentist last week including skin grafting from roof of mouth to dental implants. He took abx (unknown name) for approx 5 days thereafter but dc'd as they were causing heartburn. He denies fever but states for few days after procedure he had chills/sweats, weakness. Now improved. He has trouble eating due to pain and is down 10 lbs from last visit. No diarrhea or blood in stool.    The following portions of the patient's history were reviewed and updated as appropriate: allergies, current medications, past family history, past medical history, past social history, past surgical history and problem list.    Review of Systems   Constitutional: Positive for fatigue and unexpected weight change.        See HPI   HENT: Positive for ear pain, mouth sores and sore throat. Negative for nosebleeds, postnasal drip, rhinorrhea and sinus pressure.    Eyes: Negative for pain, discharge and redness.   Respiratory: Negative for cough, shortness of breath and wheezing.    Cardiovascular: Negative for chest pain and palpitations.   Gastrointestinal: Negative for diarrhea, nausea and vomiting.   Skin: Negative for rash.   Neurological: Positive for weakness and headaches. Negative for dizziness.   Psychiatric/Behavioral: Positive for sleep disturbance.       Objective    Vitals:    04/19/17 1337   BP: 112/72   Pulse: 94   Temp: 98.7 °F (37.1 °C)   SpO2: 98%     Body mass index is 26.88 kg/(m^2).  Last 2 weights    04/19/17  1337   Weight: 190 lb (86.2 kg)       Physical Exam   Constitutional: He is oriented to person, place, and time. He appears well-developed and well-nourished. He is cooperative. He does not appear ill. No distress.   HENT:   Head: Normocephalic and atraumatic.   Right Ear: Tympanic  membrane, external ear and ear canal normal.   Left Ear: Tympanic membrane, external ear and ear canal normal.   Nose: Nose normal. No mucosal edema or rhinorrhea. Right sinus exhibits no maxillary sinus tenderness and no frontal sinus tenderness. Left sinus exhibits no maxillary sinus tenderness and no frontal sinus tenderness.   Mouth/Throat: Mucous membranes are not dry. Oral lesions present. Abnormal dentition (edentulous). No posterior oropharyngeal erythema.       Eyes: Conjunctivae and lids are normal.   Neck: Neck supple. Normal carotid pulses present. No thyroid mass and no thyromegaly present.   Lymphadenopathy:     He has no cervical adenopathy.   Neurological: He is alert and oriented to person, place, and time. He has normal strength. No cranial nerve deficit. Gait normal.   Skin: Skin is warm and dry. No rash noted.   Psychiatric: He has a normal mood and affect. His behavior is normal.   Nursing note and vitals reviewed.      Assessment/Plan   Jacob was seen today for oral pain.    Diagnoses and all orders for this visit:    Oral cavity pain    Chronic obstructive pulmonary disease, unspecified COPD type  -     albuterol (PROVENTIL) (2.5 MG/3ML) 0.083% nebulizer solution; Take 2.5 mg by nebulization Every 4 (Four) Hours As Needed for Wheezing.    Abnormal weight loss    I have discussed with Mr. Rivers that his oral pain is likely consistent with his recent procedure. No sign of infection at this time. I rec'd he continue topical treatment/mouthwash as his dentist has previously rx'd which he began approx 2 days ago.  He is instructed to report concerning symptoms such as fever/chills, malaise, purulent drainage from oral wounds, purulent nasal drainage, facial pain, etc.  Patient was encouraged to keep me informed of any acute changes, lack of improvement, or any new concerning symptoms.  Pt is aware of reasons to seek emergent care.  Keep routine f/u apt, f/u sooner as needed.  Patient voiced  understanding of all instructions and denied further questions.

## 2017-07-06 NOTE — PROGRESS NOTES
Subjective   Jacob Rivers is a 67 y.o. male.     Hip Pain    The incident occurred more than 1 week ago (for years, worse over past 2 months). The incident occurred at home. The injury mechanism is unknown. The pain is present in the right hip (radiates to right groin). The pain is moderate. The pain has been fluctuating since onset. Associated symptoms include a loss of motion. Pertinent negatives include no inability to bear weight, loss of sensation, muscle weakness, numbness or tingling. The symptoms are aggravated by movement. He has tried rest and acetaminophen for the symptoms. The treatment provided mild relief.   Pain seems to be worse when trying to lie flat on back with leg extended straight. No particular increased with weight bearing, although increased with prolonged activity.    Since last visit [facility] has [facility]. Had routine labs at that time. DM stable. BP stable. CAD and COPD stable. He was also seen by dermatology. Had benign cyst removed from left cheek. Healing well.    The following portions of the patient's history were reviewed and updated as appropriate: allergies, current medications, past family history, past medical history, past social history, past surgical history and problem list.    Review of Systems   Constitutional: Positive for fatigue. Negative for chills, fever and unexpected weight change.   HENT: Negative.    Eyes: Negative.    Respiratory: Positive for cough (stable) and shortness of breath (stable). Negative for wheezing.    Cardiovascular: Positive for palpitations. Negative for chest pain and leg swelling.   Gastrointestinal: Negative.    Endocrine: Positive for heat intolerance.   Genitourinary: Negative for dysuria and hematuria.   Musculoskeletal: Positive for arthralgias and joint swelling.   Skin: Negative for rash.   Neurological: Negative for dizziness, tingling, syncope, weakness, numbness and headaches.   Hematological: Negative.    Psychiatric/Behavioral:  Positive for sleep disturbance (due to pain).       Objective    Vitals:    07/06/17 0832   BP: 106/68   Pulse: 80   SpO2: 98%     Body mass index is 28.15 kg/(m^2).  Last 2 weights    07/06/17  0832   Weight: 199 lb (90.3 kg)       Physical Exam   Constitutional: He is oriented to person, place, and time. He appears well-developed and well-nourished. He is cooperative. He does not appear ill. No distress.   HENT:   Head: Normocephalic and atraumatic.   Right Ear: Decreased hearing is noted.   Left Ear: Decreased hearing is noted.   Mouth/Throat: Mucous membranes are normal. Mucous membranes are not dry.   Eyes: Conjunctivae and lids are normal.   Neck: Phonation normal. Neck supple. Normal carotid pulses present. No thyroid mass and no thyromegaly present.   Cardiovascular: Normal rate and intact distal pulses.   Occasional extrasystoles are present.   Murmur (stable, baseline) heard.   Systolic murmur is present with a grade of 3/6   Pulmonary/Chest: Effort normal. No respiratory distress. He has decreased breath sounds (baseline) in the left upper field, the left middle field and the left lower field. He has no wheezes. He has no rhonchi. He has no rales.   Musculoskeletal:        Right hip: He exhibits decreased range of motion (mildly decreased with flexion, neg JERMAINE. With active ROM right hip externally rotates with flexion. This is asymmetric when compared to left hip exam.) and bony tenderness (anterior TTP, moderate). He exhibits normal strength and no crepitus.        Left hip: Normal.       Vascular Status -  His exam exhibits no right foot edema. His exam exhibits no left foot edema.  Lymphadenopathy:     He has no cervical adenopathy.        Right: No supraclavicular adenopathy present.        Left: No supraclavicular adenopathy present.   Neurological: He is alert and oriented to person, place, and time. He has normal strength and normal reflexes. He displays no tremor. No cranial nerve deficit. Gait  normal.   Skin: Skin is warm and dry. No ecchymosis and no rash noted.   Psychiatric: He has a normal mood and affect. His behavior is normal. Cognition and memory are normal.   Nursing note and vitals reviewed.      Assessment/Plan   Jacob was seen today for hip pain.    Diagnoses and all orders for this visit:    Chronic right hip pain  -     XR Hip With or Without Pelvis 2 - 3 View Right; Future    Need for zoster vaccination  -     zoster vaccine live (ZOSTAVAX) 18081 UNT/0.65ML reconstituted suspension; Inject 19,400 Units under the skin 1 (One) Time for 1 dose.    Multiple chronic stable conditions.  I will contact patient regarding test results and provide instructions regarding any necessary changes in plan of care.  Consider referral to PT and/or ortho pending result review.  Continue current meds, heart healthy diet.  Patient was encouraged to keep me informed of any acute changes, lack of improvement, or any new concerning symptoms.  Pt is aware of reasons to seek emergent care.  Patient voiced understanding of all instructions and denied further questions.

## 2017-07-11 NOTE — PROGRESS NOTES
Subjective   Jacob Rivers is a 67 y.o. male.     Dizziness   This is a new problem. The current episode started in the past 7 days (2-3 days ago). The problem occurs intermittently. The problem has been gradually improving. Associated symptoms include fatigue and nausea. Pertinent negatives include no abdominal pain, change in bowel habit, chest pain, congestion, coughing, diaphoresis, fever, headaches, rash, sore throat, swollen glands, urinary symptoms, visual change, vomiting or weakness. Exacerbated by: turning head, bending over, lying down, rolling over. He has tried rest for the symptoms. The treatment provided mild relief.     He would also like to discuss recent hip xray. Seen last week for hip pain.    The following portions of the patient's history were reviewed and updated as appropriate: allergies, current medications, past family history, past medical history, past social history, past surgical history and problem list.    Review of Systems   Constitutional: Positive for fatigue. Negative for appetite change, diaphoresis and fever.   HENT: Positive for mouth sores (chronic). Negative for congestion, ear discharge, ear pain, sinus pressure, sneezing, sore throat and trouble swallowing.    Eyes: Negative for visual disturbance.   Respiratory: Negative for cough, shortness of breath and wheezing.    Cardiovascular: Negative for chest pain and palpitations.   Gastrointestinal: Positive for nausea. Negative for abdominal pain, change in bowel habit, diarrhea and vomiting.   Skin: Negative for rash.   Neurological: Positive for dizziness. Negative for seizures, syncope, speech difficulty, weakness and headaches.   Hematological: Bruises/bleeds easily.       Objective    Vitals:    07/11/17 1150   BP: 115/84   Pulse: 80   SpO2:      Body mass index is 26.85 kg/(m^2).  Last 2 weights    07/11/17  1134   Weight: 198 lb (89.8 kg)       Physical Exam   Constitutional: He is oriented to person, place, and time.  He appears well-developed and well-nourished. He is cooperative. He does not appear ill. No distress.   HENT:   Head: Normocephalic and atraumatic.   Right Ear: Tympanic membrane, external ear and ear canal normal.   Left Ear: Tympanic membrane, external ear and ear canal normal.   Nose: Nose normal.   Mouth/Throat: Oropharynx is clear and moist and mucous membranes are normal. Mucous membranes are not dry. No oral lesions.   Eyes: Conjunctivae, EOM and lids are normal.   Neck: Neck supple. Normal carotid pulses present.   Cardiovascular: Normal rate, regular rhythm and intact distal pulses.    Pulmonary/Chest: Effort normal. He has decreased breath sounds (chronic) in the left upper field, the left middle field and the left lower field. He has no wheezes. He has no rhonchi. He has no rales.       Vascular Status -  His exam exhibits no right foot edema. His exam exhibits no left foot edema.  Neurological: He is alert and oriented to person, place, and time. He has normal strength. He displays no tremor. No cranial nerve deficit or sensory deficit. Coordination and gait normal.   + [place] Hallpike on left   Skin: Skin is warm and dry. Bruising (senile purpura bilateral forearms) noted.   Psychiatric: He has a normal mood and affect. His speech is normal and behavior is normal. Thought content normal. Cognition and memory are normal.   Nursing note and vitals reviewed.    Hip xray results reviewed with pt.    Assessment/Plan   Jacob was seen today for dizziness.    Diagnoses and all orders for this visit:    BPPV (benign paroxysmal positional vertigo), left    Arthritis of right hip  -     Ambulatory Referral to Physical Therapy Evaluate and treat    Other orders  -     meclizine (ANTIVERT) 12.5 MG tablet; Take 1 tablet by mouth 3 (Three) Times a Day As Needed for dizziness or Nausea.    Educated regarding BPPV and tx option. Handout provided as well. Overall improving course.  Have also reviewed tx options for hip  arthritis. Desires referral to PT.   Patient was encouraged to keep me informed of any acute changes, lack of improvement, or any new concerning symptoms.  Pt is aware of reasons to seek emergent care.  Patient voiced understanding of all instructions and denied further questions.

## 2017-07-11 NOTE — PATIENT INSTRUCTIONS
Benign Positional Vertigo  Vertigo is the feeling that you or your surroundings are moving when they are not. Benign positional vertigo is the most common form of vertigo. The cause of this condition is not serious (is benign). This condition is triggered by certain movements and positions (is positional). This condition can be dangerous if it occurs while you are doing something that could endanger you or others, such as driving.   CAUSES  In many cases, the cause of this condition is not known. It may be caused by a disturbance in an area of the inner ear that helps your brain to sense movement and balance. This disturbance can be caused by a viral infection (labyrinthitis), head injury, or repetitive motion.  RISK FACTORS  This condition is more likely to develop in:  · Women.  · People who are 50 years of age or older.  SYMPTOMS  Symptoms of this condition usually happen when you move your head or your eyes in different directions. Symptoms may start suddenly, and they usually last for less than a minute. Symptoms may include:  · Loss of balance and falling.  · Feeling like you are spinning or moving.  · Feeling like your surroundings are spinning or moving.  · Nausea and vomiting.  · Blurred vision.  · Dizziness.  · Involuntary eye movement (nystagmus).  Symptoms can be mild and cause only slight annoyance, or they can be severe and interfere with daily life. Episodes of benign positional vertigo may return (recur) over time, and they may be triggered by certain movements. Symptoms may improve over time.  DIAGNOSIS  This condition is usually diagnosed by medical history and a physical exam of the head, neck, and ears. You may be referred to a health care provider who specializes in ear, nose, and throat (ENT) problems (otolaryngologist) or a provider who specializes in disorders of the nervous system (neurologist). You may have additional testing, including:  · MRI.  · A CT scan.  · Eye movement tests. Your  "health care provider may ask you to change positions quickly while he or she watches you for symptoms of benign positional vertigo, such as nystagmus. Eye movement may be tested with an electronystagmogram (ENG), caloric stimulation, the Peralta-Hallpike test, or the roll test.  · An electroencephalogram (EEG). This records electrical activity in your brain.  · Hearing tests.  TREATMENT  Usually, your health care provider will treat this by moving your head in specific positions to adjust your inner ear back to normal. Surgery may be needed in severe cases, but this is rare. In some cases, benign positional vertigo may resolve on its own in 2-4 weeks.  HOME CARE INSTRUCTIONS  Safety  · Move slowly. Avoid sudden body or head movements.  · Avoid driving.  · Avoid operating heavy machinery.  · Avoid doing any tasks that would be dangerous to you or others if a vertigo episode would occur.  · If you have trouble walking or keeping your balance, try using a cane for stability. If you feel dizzy or unstable, sit down right away.  · Return to your normal activities as told by your health care provider. Ask your health care provider what activities are safe for you.  General Instructions  · Take over-the-counter and prescription medicines only as told by your health care provider.  · Avoid certain positions or movements as told by your health care provider.  · Drink enough fluid to keep your urine clear or pale yellow.  · Keep all follow-up visits as told by your health care provider. This is important.  SEEK MEDICAL CARE IF:  · You have a fever.  · Your condition gets worse or you develop new symptoms.  · Your family or friends notice any behavioral changes.  · Your nausea or vomiting gets worse.  · You have numbness or a \"pins and needles\" sensation.  SEEK IMMEDIATE MEDICAL CARE IF:  · You have difficulty speaking or moving.  · You are always dizzy.  · You faint.  · You develop severe headaches.  · You have weakness in your " legs or arms.  · You have changes in your hearing or vision.  · You develop a stiff neck.  · You develop sensitivity to light.     This information is not intended to replace advice given to you by your health care provider. Make sure you discuss any questions you have with your health care provider.     Document Released: 09/25/2007 Document Revised: 09/07/2016 Document Reviewed: 04/11/2016  [employer] Patient Education ©2017 [employer]

## 2017-07-19 NOTE — PROGRESS NOTES
Subjective   Jacob Rivers is a 67 y.o. male.     Back Pain   This is a new problem. The current episode started in the past 7 days (felt sudden pain/cramp upon straightening from prolonged bent position). The problem occurs constantly. The problem has been gradually improving since onset. The pain is present in the lumbar spine. The quality of the pain is described as aching and cramping. The pain does not radiate. The pain is severe. The pain is worse during the day. The symptoms are aggravated by twisting and bending. Stiffness is present all day. Pertinent negatives include no abdominal pain, bladder incontinence, bowel incontinence, chest pain, dysuria, fever, headaches, leg pain, numbness, paresthesias, tingling, weakness or weight loss. Risk factors include history of steroid use. He has tried analgesics, heat, ice, muscle relaxant and NSAIDs (see in ER 7/17 with good initial reponse to tx; recurred when he bent to  object from floor) for the symptoms. The treatment provided moderate relief.     The following portions of the patient's history were reviewed and updated as appropriate: allergies, current medications, past family history, past medical history, past social history, past surgical history and problem list.    Review of Systems   Constitutional: Negative for appetite change, chills, fever, unexpected weight change and weight loss.   Eyes: Negative.    Respiratory: Negative.    Cardiovascular: Negative.  Negative for chest pain.   Gastrointestinal: Negative.  Negative for abdominal pain and bowel incontinence.   Genitourinary: Negative.  Negative for bladder incontinence and dysuria.   Musculoskeletal: Positive for back pain.   Skin: Negative for rash.   Neurological: Negative for tingling, weakness, numbness, headaches and paresthesias.   Psychiatric/Behavioral: Positive for sleep disturbance.       Objective    Vitals:    07/19/17 1046   BP: 112/72   Pulse: 70   SpO2: 97%     Body mass  index is 26.72 kg/(m^2).  Last 2 weights    07/19/17  1046   Weight: 197 lb (89.4 kg)       Physical Exam   Constitutional: He is oriented to person, place, and time. He appears well-developed. He is cooperative. He does not appear ill. He appears distressed (mild due to pain, stiffness).   Musculoskeletal:        Lumbar back: He exhibits decreased range of motion (decreased extension, rotation), tenderness (diffuse soft tissue left lower back) and spasm. He exhibits no bony tenderness.   Neurological: He is alert and oriented to person, place, and time. He has normal strength and normal reflexes. He displays no tremor. No sensory deficit. Gait normal.   Neg SLR   Skin: Skin is warm and dry. No rash noted.   Psychiatric: He has a normal mood and affect. His behavior is normal.   Nursing note and vitals reviewed.    ER records reviewed and scanned to chart. Pt was dc'd on norflex and ibuprofen.    Assessment/Plan   Jacob was seen today for back pain.    Diagnoses and all orders for this visit:    Lumbar strain, initial encounter  -     MethylPREDNISolone (MEDROL, ALBIN,) 4 MG tablet; Take as directed on package instructions.    Toradol 60 mg IM and DepoMedrol 80 mg IM given in office today. Continue norflex and ibuprofen as needed.  Handout provided regarding conservative mgnt of LBP.  Patient was encouraged to keep me informed of any acute changes, lack of improvement, or any new concerning symptoms.  Pt is aware of reasons to seek emergent care.  Patient voiced understanding of all instructions and denied further questions.

## 2017-07-19 NOTE — PATIENT INSTRUCTIONS
Continue norflex and ibuprofen as needed for muscle spasm and pain.      Lumbosacral Strain  Lumbosacral strain is a strain of any of the parts that make up your lumbosacral vertebrae. Your lumbosacral vertebrae are the bones that make up the lower third of your backbone. Your lumbosacral vertebrae are held together by muscles and tough, fibrous tissue (ligaments).   CAUSES   A sudden blow to your back can cause lumbosacral strain. Also, anything that causes an excessive stretch of the muscles in the low back can cause this strain. This is typically seen when people exert themselves strenuously, fall, lift heavy objects, bend, or crouch repeatedly.  RISK FACTORS  · Physically demanding work.  · Participation in pushing or pulling sports or sports that require a sudden twist of the back (tennis, golf, baseball).  · Weight lifting.  · Excessive lower back curvature.  · Forward-tilted pelvis.  · Weak back or abdominal muscles or both.  · Tight hamstrings.  SIGNS AND SYMPTOMS   Lumbosacral strain may cause pain in the area of your injury or pain that moves (radiates) down your leg.   DIAGNOSIS  Your health care provider can often diagnose lumbosacral strain through a physical exam. In some cases, you may need tests such as X-ray exams.   TREATMENT   Treatment for your lower back injury depends on many factors that your clinician will have to evaluate. However, most treatment will include the use of anti-inflammatory medicines.  HOME CARE INSTRUCTIONS   · Avoid hard physical activities (tennis, racquetball, waterskiing) if you are not in proper physical condition for it. This may aggravate or create problems.  · If you have a back problem, avoid sports requiring sudden body movements. Swimming and walking are generally safer activities.  · Maintain good posture.  · Maintain a healthy weight.  · For acute conditions, you may put ice on the injured area.    Put ice in a plastic bag.    Place a towel between your skin and  the bag.    Leave the ice on for 20 minutes, 2-3 times a day.  · When the low back starts healing, stretching and strengthening exercises may be recommended.  SEEK MEDICAL CARE IF:  · Your back pain is getting worse.  · You experience severe back pain not relieved with medicines.  SEEK IMMEDIATE MEDICAL CARE IF:   · You have numbness, tingling, weakness, or problems with the use of your arms or legs.  · There is a change in bowel or bladder control.  · You have increasing pain in any area of the body, including your belly (abdomen).  · You notice shortness of breath, dizziness, or feel faint.  · You feel sick to your stomach (nauseous), are throwing up (vomiting), or become sweaty.  · You notice discoloration of your toes or legs, or your feet get very cold.  MAKE SURE YOU:   · Understand these instructions.  · Will watch your condition.  · Will get help right away if you are not doing well or get worse.     This information is not intended to replace advice given to you by your health care provider. Make sure you discuss any questions you have with your health care provider.     Document Released: 09/27/2006 Document Revised: 04/10/2017 Document Reviewed: 08/06/2014  [employer] Interactive Patient Education ©2017 [employer]

## 2017-07-27 NOTE — PROGRESS NOTES
[facility]  Office Progress Note  Jacob Rivers  1950  [phone]      Visit Date: 07/27/2017    PCP: MD [address]  [place] [address]    IDENTIFICATION: A 67 y.o. male originally from [facility]    Chief Complaint   Patient presents with   • Hypertension   • Congestive Heart Failure   • Coronary Artery Disease       PROBLEM LIST:   1. CHF  1. 2009 CABG X 2 [employer]  2. 2013 echo ef 45%  3. 6/14 proBNP 908 BUN 17/1.0, K 3.3  4. 6/16 Echo EF 40%  2. Lung CA  1. 1997 L pneumonectomy XRT - CAmp  2. 4/14 CTA benign  3. HTN  4. HL  1. 1/16 147/97/52/75  5. Cramps 2014   6. 1/16 AAA screen neg    Allergies  Allergies   Allergen Reactions   • Penicillins        Current Medications    Current Outpatient Prescriptions:   •  albuterol (PROVENTIL HFA;VENTOLIN HFA) 108 (90 BASE) MCG/ACT inhaler, INHALE 2 PUFFS EVERY 4 HOURS AS NEEDED., Disp: , Rfl:   •  albuterol (PROVENTIL) (2.5 MG/3ML) 0.083% nebulizer solution, Take 2.5 mg by nebulization Every 4 (Four) Hours As Needed for Wheezing., Disp: 90 mL, Rfl: 1  •  allopurinol (ZYLOPRIM) 100 MG tablet, Take 1 tablet by mouth Daily. (Patient taking differently: Take 100 mg by mouth As Needed.), Disp: 30 tablet, Rfl: 2  •  aspirin 81 MG tablet, Take 1 tablet by mouth daily., Disp: , Rfl:   •  carvedilol (COREG) 3.125 MG tablet, Take 1 tablet by mouth 2 (two) times a day with meals., Disp: , Rfl:   •  cetirizine (ZyrTEC) 10 MG tablet, Take 1 tablet by mouth daily., Disp: , Rfl:   •  Cholecalciferol (VITAMIN D3) 5000 UNITS tablet, Take 5,000 Units by mouth Daily., Disp: 30 tablet, Rfl: 5  •  cyanocobalamin (VITAMIN B-12) 1000 MCG tablet, Take 1 tablet by mouth 1 (one) time daily., Disp: , Rfl:   •  enalapril (VASOTEC) 5 MG tablet, Take 1 tablet by mouth 1 (one) time daily., Disp: , Rfl:   •  ferrous sulfate 325 (65 FE) MG tablet, Take 1 tablet by mouth 1 (one) time daily., Disp: , Rfl:   •  fluticasone (FLONASE) 50 MCG/ACT nasal  spray, 2 sprays into each nostril Daily., Disp: , Rfl:   •  folic acid (FOLVITE) 1 MG tablet, Take 1 tablet by mouth daily., Disp: , Rfl:   •  furosemide (LASIX) 40 MG tablet, Take 1 tablet by mouth 1 (one) time daily., Disp: , Rfl:   •  levothyroxine (SYNTHROID, LEVOTHROID) 75 MCG tablet, Take 1 tablet by mouth 1 (one) time daily., Disp: , Rfl:   •  meclizine (ANTIVERT) 12.5 MG tablet, Take 1 tablet by mouth 3 (Three) Times a Day As Needed for dizziness or Nausea., Disp: 30 tablet, Rfl: 0  •  MethylPREDNISolone (MEDROL, ALBIN,) 4 MG tablet, Take as directed on package instructions., Disp: 21 tablet, Rfl: 0  •  montelukast (SINGULAIR) 10 MG tablet, Take 1 tablet by mouth Every Night. Take one tablet at bedtime for seasonal allergies., Disp: 30 tablet, Rfl: 5  •  naproxen (NAPROSYN) 375 MG tablet, Take 375 mg by mouth 2 (Two) Times a Day As Needed for Mild Pain (1-3)., Disp: , Rfl:   •  omeprazole (PriLOSEC) 20 MG capsule, Take 2 capsules by mouth daily., Disp: , Rfl:   •  potassium chloride (K-DUR,KLOR-CON) 20 MEQ CR tablet, Take 1 tablet by mouth Daily., Disp: 90 tablet, Rfl: 3  •  rosuvastatin (CRESTOR) 40 MG tablet, Take 1 tablet by mouth daily., Disp: , Rfl:   •  tamsulosin (FLOMAX) 0.4 MG capsule 24 hr capsule, Take 1 capsule by mouth Every Night., Disp: , Rfl:   •  tiotropium (SPIRIVA) 18 MCG per inhalation capsule, daily. Inhale one dose using the handihaler device., Disp: , Rfl:   •  traMADol (ULTRAM) 50 MG tablet, Take 1 tablet by mouth 4 (four) times a day as needed., Disp: , Rfl:   •  valACYclovir (VALTREX) 1000 MG tablet, Take 2 tablets by mouth 1 (time) a day as needed for cold sore outbreaks., Disp: , Rfl:       History of Present Illness   Pt is here for follow up. Staying active with yard work, no limitations. No changes in dyspnea. Does mention some leg cramping, especially at night.  Pt denies any chest pain, dyspnea, dyspnea on exertion, orthopnea, PND, palpitations, lower extremity edema, or  "claudication.    ROS:  All systems have been reviewed and are negative with the exception of those mentioned in the HPI.    OBJECTIVE:  Vitals:    07/27/17 1050   BP: 116/58   BP Location: Left arm   Patient Position: Sitting   Pulse: 81   Weight: 195 lb 6.4 oz (88.6 kg)   Height: 72\" (182.9 cm)     Physical Exam   Constitutional: He is oriented to person, place, and time. He appears well-developed and well-nourished. No distress.   Neck: Neck supple. No JVD present.   Cardiovascular: Normal rate, regular rhythm and intact distal pulses.    Murmur heard.  Pulses:       Radial pulses are 2+ on the right side, and 2+ on the left side.        Dorsalis pedis pulses are 2+ on the right side, and 2+ on the left side.        Posterior tibial pulses are 2+ on the right side, and 2+ on the left side.   Pulmonary/Chest: Effort normal and breath sounds normal. He has no wheezes. He has no rales.   Abdominal: Soft. Bowel sounds are normal. There is no tenderness. There is no guarding.   Musculoskeletal: He exhibits no edema or tenderness.   Neurological: He is alert and oriented to person, place, and time.   Nursing note and vitals reviewed.      Diagnostic Data:  Procedures      ASSESSMENT:   Diagnosis Plan   1. Coronary arteriosclerosis in native artery     2. Chronic combined systolic and diastolic congestive heart failure     3. Essential hypertension     4. Hypercholesterolemia         PLAN:  1. Medically managed, no anginal equivalent.  We'll follow EF and consider ICD is appropriate.   2. Chronic ischemic systolic CHF, with low normal BP and occasional presyncopal symptoms, defer addition of entresto at this time   3. Well controlled  4. Continue statin therapy with crestor  5. Following with pulm every 6 months  6. Leg cramping at rest- pt instructed to take OTC magnesium, likely low Mg due to PPI use.     Lacie Ernst MD, thank you for referring Mr. Rivers for evaluation.  I have forwarded my electronically generated " recommendations to you for review.  Please do not hesitate to call with any questions.    Scribed for Logan Mcdonald MD by Karissa Ernst PA-C. 7/27/2017  11:24 AM  I, Logan Mcdonald MD, personally performed the services described in this documentation as scribed by the above named individual in my presence, and it is both accurate and complete.  7/27/2017  11:30 AM    Logan Mcdonald MD, FACC

## 2017-11-20 NOTE — PROGRESS NOTES
Subjective   Jacob Rivers is a 67 y.o. male.     History of Present Illness  Mr. Rivers presents today for ER f/u. He was seen at [facility] ER on 11/18 for cough, chest congestion. Records reviewed/scanned to chart. CXR was stable from previous. He has h/o/ COPD and left lobectomy. Also with h/o CAD s/p CABG. He was tx'd with Zpak and dc'd home. Over past 2 days he has had increasing cough, productive cough with discolored sputum, severe malaise, chills, subj fever. Describes a rattle in his chest, worse with lying down. Also with nasal congestion, drainage. Mild sore throat. No urinary symptoms. No change in BMs. No blood in stool.    ER records:  CXR as above  CBC with WBC 8.1, left shift, H/H 11.3/36  BMP with BUN/Cr 22/1.64    The following portions of the patient's history were reviewed and updated as appropriate: allergies, current medications, past family history, past medical history, past social history, past surgical history and problem list.    Review of Systems   Constitutional: Positive for appetite change, chills, diaphoresis, fatigue, fever and unexpected weight change.   HENT: Positive for congestion, postnasal drip, rhinorrhea and sore throat. Negative for ear pain, mouth sores, nosebleeds, sneezing and trouble swallowing.    Eyes: Negative for pain, discharge and redness.   Respiratory: Positive for cough, chest tightness, shortness of breath and wheezing.    Cardiovascular: Negative for chest pain, palpitations and leg swelling.   Gastrointestinal: Negative for abdominal pain, diarrhea, nausea and vomiting.   Genitourinary: Negative for dysuria and hematuria.   Musculoskeletal: Positive for arthralgias.   Skin: Negative for rash.   Neurological: Positive for dizziness, weakness and headaches.   Hematological: Negative for adenopathy.   Psychiatric/Behavioral: Positive for sleep disturbance. Negative for confusion.       Objective    Vitals:    11/20/17 1133   BP: 96/64   Pulse: 68   Temp: (!) 100.6 °F  (38.1 °C)   SpO2: 94%     Body mass index is 25.77 kg/(m^2).  Last 2 weights    11/20/17  1133   Weight: 190 lb (86.2 kg)       Physical Exam   Constitutional: He is oriented to person, place, and time. He appears well-developed and well-nourished. He is cooperative. He appears ill. No distress.   HENT:   Head: Normocephalic and atraumatic.   Right Ear: Tympanic membrane, external ear and ear canal normal. Decreased hearing is noted.   Left Ear: Tympanic membrane, external ear and ear canal normal. Decreased hearing is noted.   Nose: Mucosal edema and rhinorrhea (clear) present.   Mouth/Throat: Mucous membranes are normal. Mucous membranes are not dry. He has dentures. No oral lesions. Posterior oropharyngeal erythema present. No oropharyngeal exudate.   Eyes: Conjunctivae and lids are normal.   Neck: Neck supple.   Cardiovascular: Normal rate, regular rhythm, normal heart sounds and intact distal pulses.    Pulmonary/Chest: Apnea: mild with ambulation. He has decreased breath sounds (consistent with h/o lobectomy) in the left upper field, the left middle field and the left lower field. He has wheezes in the right upper field, the right middle field and the right lower field. He has rhonchi in the right upper field, the right middle field and the right lower field.       Vascular Status -  His exam exhibits no right foot edema. His exam exhibits no left foot edema.  Lymphadenopathy:     He has no cervical adenopathy.        Right: No supraclavicular adenopathy present.        Left: No supraclavicular adenopathy present.   Neurological: He is alert and oriented to person, place, and time. He has normal strength. Gait normal.   Skin: Skin is warm and dry. No bruising and no rash noted. There is pallor.   Psychiatric: He has a normal mood and affect. His behavior is normal. Cognition and memory are normal.   Nursing note and vitals reviewed.    Recent Results (from the past 24 hour(s))   POC Influenza A / B     Collection Time: 11/20/17 12:48 PM   Result Value Ref Range    Rapid Influenza A Ag POSITIVE     Rapid Influenza B Ag NEG     Internal Control Passed Passed    Lot Number [id number]     Expiration Date 6/11/19      Assessment/Plan   Jacob was seen today for cough, nasal congestion, fever and chills.    Diagnoses and all orders for this visit:    Influenza A  -     oseltamivir (TAMIFLU) 75 MG capsule; Take 1 capsule by mouth 2 (Two) Times a Day.  -     XR Chest PA & Lateral; Future  -     guaiFENesin (MUCINEX) 600 MG 12 hr tablet; Take 1 tablet by mouth 2 (Two) Times a Day. With full glass of water.  -     methylPREDNISolone acetate (DEPO-medrol) injection 120 mg; Inject 1.5 mL into the shoulder, thigh, or buttocks 1 (One) Time.    Cough  -     predniSONE (DELTASONE) 20 MG tablet; Take 1 tablet by mouth Daily.  -     XR Chest PA & Lateral; Future  -     guaiFENesin (MUCINEX) 600 MG 12 hr tablet; Take 1 tablet by mouth 2 (Two) Times a Day. With full glass of water.  -     methylPREDNISolone acetate (DEPO-medrol) injection 120 mg; Inject 1.5 mL into the shoulder, thigh, or buttocks 1 (One) Time.  -     POC Influenza A / B    Fever in other diseases  -     XR Chest PA & Lateral; Future  -     methylPREDNISolone acetate (DEPO-medrol) injection 120 mg; Inject 1.5 mL into the shoulder, thigh, or buttocks 1 (One) Time.  -     POC Influenza A / B    Bronchospasm, acute  -     predniSONE (DELTASONE) 20 MG tablet; Take 1 tablet by mouth Daily.  -     XR Chest PA & Lateral; Future  -     methylPREDNISolone acetate (DEPO-medrol) injection 120 mg; Inject 1.5 mL into the shoulder, thigh, or buttocks 1 (One) Time.  -     POC Influenza A / B    Symptomatic tx reviewed.  Take alb neb at least 4 per day as needed.  Add OTC mucinex.  Increase fluids.  Recheck in 1 week, sooner as needed/instructed.  Patient was encouraged to keep me informed of any acute changes, lack of improvement, or any new concerning symptoms.  Pt is aware of reasons  to seek emergent care.  Patient voiced understanding of all instructions and denied further questions.

## 2017-11-21 NOTE — TELEPHONE ENCOUNTER
----- Message from Lacie Ernst MD sent at 11/21/2017  1:45 PM EST -----  Please inform pt his xray was neg for pneumonia or fluid.

## 2017-11-27 NOTE — PROGRESS NOTES
"Subjective   Jacob Rivers is a 67 y.o. male.     History of Present Illness  Mr. Rivers presents today for recheck. He was seen 1 week ago for cough, fever, etc. Dx'd with influenza. Has underlying COPD as well. Tx'd with Tamiflu, nebs, and corticosteroids. He has taken medications as rx'd. Reports today stating fever has resolved. He feels less short of breath. Still having fatigue, persistent cough intermittently productive of yellow sputum. Assoc'd with \"rattling in chest\". Cough more severe when lying down. Not able to sleep due to cough. Has been using nebs every 4-6 hrs.    The following portions of the patient's history were reviewed and updated as appropriate: allergies, current medications, past family history, past medical history, past social history, past surgical history and problem list.    Review of Systems   Constitutional: Positive for appetite change, fatigue and unexpected weight change. Negative for chills, diaphoresis and fever.   HENT: Positive for postnasal drip. Negative for congestion, ear pain, mouth sores, nosebleeds, rhinorrhea, sneezing, sore throat and trouble swallowing.    Eyes: Negative for pain, discharge and redness.   Respiratory: Positive for cough, chest tightness, shortness of breath and wheezing.    Cardiovascular: Negative for chest pain, palpitations and leg swelling.   Gastrointestinal: Negative for abdominal pain, diarrhea, nausea and vomiting.   Genitourinary: Negative for dysuria and hematuria.   Musculoskeletal: Positive for arthralgias.   Skin: Negative for rash.   Neurological: Positive for dizziness, weakness and headaches.   Hematological: Negative for adenopathy.   Psychiatric/Behavioral: Positive for sleep disturbance. Negative for confusion.       Objective    Vitals:    11/27/17 1158   BP: 115/68   Pulse: 100   Temp: 98.7 °F (37.1 °C)   SpO2: 96%     Body mass index is 25.23 kg/(m^2).  Last 2 weights    11/27/17  1158   Weight: 186 lb (84.4 kg)       Physical " Exam   Constitutional: He is oriented to person, place, and time. He appears well-developed and well-nourished. He is cooperative. He does not appear ill. No distress.   HENT:   Head: Normocephalic and atraumatic.   Right Ear: Tympanic membrane, external ear and ear canal normal. Decreased hearing is noted.   Left Ear: Tympanic membrane, external ear and ear canal normal. Decreased hearing is noted.   Nose: Mucosal edema present. No rhinorrhea.   Mouth/Throat: Oropharynx is clear and moist and mucous membranes are normal. Mucous membranes are not dry. He has dentures. No oral lesions. No oropharyngeal exudate or posterior oropharyngeal erythema.   Eyes: Conjunctivae and lids are normal.   Neck: Neck supple.   Cardiovascular: Normal rate, regular rhythm, normal heart sounds and intact distal pulses.    Pulmonary/Chest: No respiratory distress. He has decreased breath sounds (consistent with h/o lobectomy) in the left upper field, the left middle field and the left lower field. He has wheezes (coarse) in the right upper field, the right middle field and the right lower field. He has rhonchi in the right upper field, the right middle field and the right lower field.       Vascular Status -  His exam exhibits no right foot edema. His exam exhibits no left foot edema.  Lymphadenopathy:     He has no cervical adenopathy.        Right: No supraclavicular adenopathy present.        Left: No supraclavicular adenopathy present.   Neurological: He is alert and oriented to person, place, and time. He has normal strength. Gait normal.   Skin: Skin is warm and dry. No bruising and no rash noted. There is pallor.   Psychiatric: He has a normal mood and affect. His behavior is normal. Cognition and memory are normal.   Nursing note and vitals reviewed.    Recent CXR at baseline, no pneumonia or effusion.    Assessment/Plan   Jacob was seen today for follow-up and cough.    Diagnoses and all orders for this visit:    Chronic  obstructive pulmonary disease with acute exacerbation    Influenza    Bronchospasm, acute    Other orders  -     guaifenesin-codeine (GUAIFENESIN AC) 100-10 MG/5ML liquid; 2 tsp po tid as needed for cough  -     doxycycline (ADOXA) 100 MG tablet; Take 1 tablet by mouth 2 (Two) Times a Day for 10 days.  -     predniSONE (DELTASONE) 20 MG tablet; 3 po daily x 2 days, then 2 po daily x 2 days, then 1 po daily x 2 days, then 1/2 po daily x 4 days    Recent dx of influenza with assoc'd COPD exacerbation. Overall improved but still having significant cough and chest congestion. Advise coverage with abx, continue nebs, additional corticosteroids, cough suppressant for night-time.  Patient was encouraged to keep me informed of any acute changes, lack of improvement, or any new concerning symptoms.  Pt is aware of reasons to seek emergent care.  Patient voiced understanding of all instructions and denied further questions.  As part of patient's treatment plan I am prescribing a controlled substance.  The patient has been made aware of the appropriate use of such medications, including potential risk of somnolence, limited ability to drive and/or work safely, and potential for dependence and/or overdose.  It has also been made clear that these medications are for use by this patient only, without concomitant use of alcohol or other substances, unless prescribed.  SRI report requested.

## 2018-01-01 ENCOUNTER — TELEPHONE (OUTPATIENT)
Dept: FAMILY MEDICINE CLINIC | Facility: CLINIC | Age: 68
End: 2018-01-01

## 2018-01-01 ENCOUNTER — OFFICE VISIT (OUTPATIENT)
Dept: FAMILY MEDICINE CLINIC | Facility: CLINIC | Age: 68
End: 2018-01-01

## 2018-01-01 ENCOUNTER — TRANSITIONAL CARE MANAGEMENT TELEPHONE ENCOUNTER (OUTPATIENT)
Dept: FAMILY MEDICINE CLINIC | Facility: CLINIC | Age: 68
End: 2018-01-01

## 2018-01-01 ENCOUNTER — RESULTS ENCOUNTER (OUTPATIENT)
Dept: FAMILY MEDICINE CLINIC | Facility: CLINIC | Age: 68
End: 2018-01-01

## 2018-01-01 VITALS
HEIGHT: 71 IN | WEIGHT: 192 LBS | BODY MASS INDEX: 26.88 KG/M2 | HEART RATE: 87 BPM | SYSTOLIC BLOOD PRESSURE: 118 MMHG | OXYGEN SATURATION: 97 % | DIASTOLIC BLOOD PRESSURE: 76 MMHG

## 2018-01-01 VITALS
OXYGEN SATURATION: 95 % | DIASTOLIC BLOOD PRESSURE: 76 MMHG | SYSTOLIC BLOOD PRESSURE: 122 MMHG | HEIGHT: 71 IN | TEMPERATURE: 99.4 F | BODY MASS INDEX: 26.74 KG/M2 | HEART RATE: 90 BPM | WEIGHT: 191 LBS

## 2018-01-01 VITALS
OXYGEN SATURATION: 99 % | TEMPERATURE: 99.2 F | SYSTOLIC BLOOD PRESSURE: 158 MMHG | WEIGHT: 193 LBS | HEIGHT: 72 IN | HEART RATE: 97 BPM | DIASTOLIC BLOOD PRESSURE: 60 MMHG | BODY MASS INDEX: 26.14 KG/M2

## 2018-01-01 VITALS
HEIGHT: 71 IN | SYSTOLIC BLOOD PRESSURE: 118 MMHG | OXYGEN SATURATION: 98 % | HEART RATE: 96 BPM | TEMPERATURE: 98.7 F | DIASTOLIC BLOOD PRESSURE: 76 MMHG | BODY MASS INDEX: 26.46 KG/M2 | WEIGHT: 189 LBS

## 2018-01-01 DIAGNOSIS — K14.0 GLOSSITIS: ICD-10-CM

## 2018-01-01 DIAGNOSIS — D64.9 NORMOCYTIC ANEMIA: ICD-10-CM

## 2018-01-01 DIAGNOSIS — E03.8 OTHER SPECIFIED HYPOTHYROIDISM: ICD-10-CM

## 2018-01-01 DIAGNOSIS — D72.828 OTHER ELEVATED WHITE BLOOD CELL (WBC) COUNT: Primary | ICD-10-CM

## 2018-01-01 DIAGNOSIS — R07.89 ATYPICAL CHEST PAIN: Primary | ICD-10-CM

## 2018-01-01 DIAGNOSIS — I10 ESSENTIAL HYPERTENSION: ICD-10-CM

## 2018-01-01 DIAGNOSIS — K21.9 GASTROESOPHAGEAL REFLUX DISEASE, ESOPHAGITIS PRESENCE NOT SPECIFIED: ICD-10-CM

## 2018-01-01 DIAGNOSIS — R53.1 WEAKNESS: ICD-10-CM

## 2018-01-01 DIAGNOSIS — J44.9 CHRONIC OBSTRUCTIVE PULMONARY DISEASE, UNSPECIFIED COPD TYPE (HCC): ICD-10-CM

## 2018-01-01 DIAGNOSIS — E78.00 HYPERCHOLESTEROLEMIA: ICD-10-CM

## 2018-01-01 DIAGNOSIS — C34.90 MALIGNANT NEOPLASM OF LUNG, UNSPECIFIED LATERALITY, UNSPECIFIED PART OF LUNG (HCC): Primary | ICD-10-CM

## 2018-01-01 DIAGNOSIS — N18.9 CHRONIC KIDNEY DISEASE, UNSPECIFIED CKD STAGE: ICD-10-CM

## 2018-01-01 DIAGNOSIS — J01.00 ACUTE NON-RECURRENT MAXILLARY SINUSITIS: ICD-10-CM

## 2018-01-01 DIAGNOSIS — E03.9 ACQUIRED HYPOTHYROIDISM: ICD-10-CM

## 2018-01-01 DIAGNOSIS — R53.81 DEBILITATED PATIENT: Primary | ICD-10-CM

## 2018-01-01 DIAGNOSIS — R06.09 DOE (DYSPNEA ON EXERTION): ICD-10-CM

## 2018-01-01 DIAGNOSIS — M00.872 ARTHRITIS OF LEFT ANKLE DUE TO OTHER BACTERIA (HCC): Primary | ICD-10-CM

## 2018-01-01 DIAGNOSIS — J18.9 COMMUNITY ACQUIRED PNEUMONIA OF RIGHT LUNG, UNSPECIFIED PART OF LUNG: Primary | ICD-10-CM

## 2018-01-01 DIAGNOSIS — I50.42 CHRONIC COMBINED SYSTOLIC AND DIASTOLIC CONGESTIVE HEART FAILURE (HCC): ICD-10-CM

## 2018-01-01 DIAGNOSIS — D72.828 OTHER ELEVATED WHITE BLOOD CELL (WBC) COUNT: ICD-10-CM

## 2018-01-01 DIAGNOSIS — Z91.81 AT HIGH RISK FOR FALLS: ICD-10-CM

## 2018-01-01 DIAGNOSIS — J44.1 CHRONIC OBSTRUCTIVE PULMONARY DISEASE WITH ACUTE EXACERBATION (HCC): ICD-10-CM

## 2018-01-01 DIAGNOSIS — R73.03 PREDIABETES: ICD-10-CM

## 2018-01-01 DIAGNOSIS — Z00.00 ANNUAL PHYSICAL EXAM: Primary | ICD-10-CM

## 2018-01-01 DIAGNOSIS — I49.9 IRREGULAR HEART RHYTHM: ICD-10-CM

## 2018-01-01 DIAGNOSIS — I25.10 CORONARY ARTERIOSCLEROSIS IN NATIVE ARTERY: ICD-10-CM

## 2018-01-01 DIAGNOSIS — E11.9 TYPE 2 DIABETES MELLITUS WITHOUT COMPLICATION, WITHOUT LONG-TERM CURRENT USE OF INSULIN (HCC): ICD-10-CM

## 2018-01-01 DIAGNOSIS — J98.01 ACUTE BRONCHOSPASM: ICD-10-CM

## 2018-01-01 LAB
BASOPHILS # BLD AUTO: 0.04 10*3/MM3 (ref 0–0.2)
BASOPHILS NFR BLD AUTO: 0.2 % (ref 0–2.5)
EOSINOPHIL # BLD AUTO: 0.03 10*3/MM3 (ref 0–0.7)
EOSINOPHIL NFR BLD AUTO: 0.1 % (ref 0–7)
ERYTHROCYTE [DISTWIDTH] IN BLOOD BY AUTOMATED COUNT: 15.3 % (ref 11.5–14.5)
HCT VFR BLD AUTO: 35.2 % (ref 42–52)
HGB BLD-MCNC: 11.1 G/DL (ref 14–18)
IMM GRANULOCYTES # BLD: 0.38 10*3/MM3 (ref 0–0.06)
IMM GRANULOCYTES NFR BLD: 1.7 % (ref 0–0.6)
LYMPHOCYTES # BLD AUTO: 1.69 10*3/MM3 (ref 0.6–3.4)
LYMPHOCYTES NFR BLD AUTO: 7.6 % (ref 10–50)
MCH RBC QN AUTO: 29.4 PG (ref 27–31)
MCHC RBC AUTO-ENTMCNC: 31.5 G/DL (ref 30–37)
MCV RBC AUTO: 93.4 FL (ref 80–94)
MONOCYTES # BLD AUTO: 1.24 10*3/MM3 (ref 0–0.9)
MONOCYTES NFR BLD AUTO: 5.5 % (ref 0–12)
NEUTROPHILS # BLD AUTO: 18.97 10*3/MM3 (ref 2–6.9)
NEUTROPHILS NFR BLD AUTO: 84.9 % (ref 37–80)
NRBC BLD AUTO-RTO: 0 /100 WBC (ref 0–0)
PLATELET # BLD AUTO: 409 10*3/MM3 (ref 130–400)
RBC # BLD AUTO: 3.77 10*6/MM3 (ref 4.7–6.1)
WBC # BLD AUTO: 22.35 10*3/MM3 (ref 4.8–10.8)

## 2018-01-01 PROCEDURE — 99213 OFFICE O/P EST LOW 20 MIN: CPT | Performed by: NURSE PRACTITIONER

## 2018-01-01 PROCEDURE — 99215 OFFICE O/P EST HI 40 MIN: CPT | Performed by: FAMILY MEDICINE

## 2018-01-01 PROCEDURE — 99496 TRANSJ CARE MGMT HIGH F2F 7D: CPT | Performed by: FAMILY MEDICINE

## 2018-01-01 PROCEDURE — 99213 OFFICE O/P EST LOW 20 MIN: CPT | Performed by: FAMILY MEDICINE

## 2018-01-01 PROCEDURE — 93000 ELECTROCARDIOGRAM COMPLETE: CPT | Performed by: FAMILY MEDICINE

## 2018-01-01 PROCEDURE — 96372 THER/PROPH/DIAG INJ SC/IM: CPT | Performed by: NURSE PRACTITIONER

## 2018-01-01 PROCEDURE — G0439 PPPS, SUBSEQ VISIT: HCPCS | Performed by: FAMILY MEDICINE

## 2018-01-01 RX ORDER — ALBUTEROL SULFATE 2.5 MG/3ML
2.5 SOLUTION RESPIRATORY (INHALATION) EVERY 4 HOURS PRN
Qty: 90 ML | Refills: 1 | Status: SHIPPED | OUTPATIENT
Start: 2018-01-01

## 2018-01-01 RX ORDER — POTASSIUM CHLORIDE 20 MEQ/1
TABLET, EXTENDED RELEASE ORAL
Qty: 30 TABLET | Refills: 11 | Status: SHIPPED | OUTPATIENT
Start: 2018-01-01

## 2018-01-01 RX ORDER — DOXYCYCLINE HYCLATE 100 MG/1
100 TABLET, DELAYED RELEASE ORAL 2 TIMES DAILY
Qty: 20 TABLET | Refills: 0 | Status: SHIPPED | OUTPATIENT
Start: 2018-01-01 | End: 2018-01-01

## 2018-01-01 RX ORDER — SULFAMETHOXAZOLE AND TRIMETHOPRIM 800; 160 MG/1; MG/1
1 TABLET ORAL 2 TIMES DAILY
Qty: 20 TABLET | Refills: 0 | Status: SHIPPED | OUTPATIENT
Start: 2018-01-01

## 2018-01-01 RX ORDER — METHYLPREDNISOLONE ACETATE 80 MG/ML
80 INJECTION, SUSPENSION INTRA-ARTICULAR; INTRALESIONAL; INTRAMUSCULAR; SOFT TISSUE ONCE
Status: COMPLETED | OUTPATIENT
Start: 2018-01-01 | End: 2018-01-01

## 2018-01-01 RX ORDER — METHYLPREDNISOLONE 4 MG/1
TABLET ORAL
COMMUNITY
Start: 2018-01-01

## 2018-01-01 RX ORDER — DIAZEPAM 10 MG/1
TABLET ORAL
COMMUNITY
Start: 2018-01-01

## 2018-01-01 RX ORDER — CEFTRIAXONE 1 G/1
1 INJECTION, POWDER, FOR SOLUTION INTRAMUSCULAR; INTRAVENOUS ONCE
Status: COMPLETED | OUTPATIENT
Start: 2018-01-01 | End: 2018-01-01

## 2018-01-01 RX ORDER — PREDNISONE 20 MG/1
TABLET ORAL
Qty: 14 TABLET | Refills: 0 | Status: SHIPPED | OUTPATIENT
Start: 2018-01-01 | End: 2018-01-01

## 2018-01-01 RX ORDER — PREDNISONE 20 MG/1
20 TABLET ORAL 2 TIMES DAILY
Qty: 10 TABLET | Refills: 0 | Status: SHIPPED | OUTPATIENT
Start: 2018-01-01

## 2018-01-01 RX ORDER — LEVOFLOXACIN 750 MG/1
TABLET ORAL
COMMUNITY
Start: 2018-01-01

## 2018-01-01 RX ORDER — ALBUTEROL SULFATE 90 UG/1
2 AEROSOL, METERED RESPIRATORY (INHALATION) EVERY 4 HOURS PRN
Qty: 1 INHALER | Refills: 5 | Status: SHIPPED | OUTPATIENT
Start: 2018-01-01

## 2018-01-01 RX ADMIN — METHYLPREDNISOLONE ACETATE 80 MG: 80 INJECTION, SUSPENSION INTRA-ARTICULAR; INTRALESIONAL; INTRAMUSCULAR; SOFT TISSUE at 16:31

## 2018-01-01 RX ADMIN — CEFTRIAXONE 1 G: 1 INJECTION, POWDER, FOR SOLUTION INTRAMUSCULAR; INTRAVENOUS at 16:30

## 2018-01-04 NOTE — PROGRESS NOTES
Subjective   Jacob Rivers is a 67 y.o. male.     Shortness of Breath   This is a new problem. The current episode started in the past 7 days (3 days). The problem occurs daily. The problem has been gradually worsening. Associated symptoms include PND, rhinorrhea, a sore throat and wheezing. Pertinent negatives include no abdominal pain, chest pain, ear pain, fever, headaches, rash or vomiting. The symptoms are aggravated by weather changes and lying flat. Associated symptoms comments: Chills and  cough. The patient has no known risk factors for DVT/PE. He has tried beta agonist inhalers and steroid inhalers (albuterol neb treatments) for the symptoms. The treatment provided mild relief. His past medical history is significant for allergies and COPD.       The following portions of the patient's history were reviewed and updated as appropriate: allergies, current medications, past family history, past medical history, past social history, past surgical history and problem list.    Review of Systems   Constitutional: Positive for activity change, chills and fatigue. Negative for diaphoresis and fever.   HENT: Positive for postnasal drip, rhinorrhea, sinus pain, sinus pressure and sore throat. Negative for ear pain.    Eyes: Negative.    Respiratory: Positive for cough, shortness of breath and wheezing.    Cardiovascular: Positive for PND. Negative for chest pain and palpitations.   Gastrointestinal: Negative for abdominal pain, diarrhea, nausea and vomiting.   Endocrine: Negative.    Genitourinary: Negative.    Musculoskeletal: Negative.    Skin: Negative for rash.   Allergic/Immunologic: Positive for immunocompromised state.   Neurological: Negative for dizziness and headaches.   Hematological: Negative.    Psychiatric/Behavioral: Negative for agitation and confusion.     Vitals:    01/04/18 1544   BP: 158/60   Pulse: 97   Temp: 99.2 °F (37.3 °C)   SpO2: 99%         Objective   Physical Exam   Constitutional: He  is oriented to person, place, and time. He appears well-developed and well-nourished. No distress.   HENT:   Head: Normocephalic.   Right Ear: External ear normal.   Left Ear: External ear normal.   Nose: Nose normal.   Mouth/Throat: Oropharyngeal exudate (PND) present.   Eyes: Conjunctivae are normal. Pupils are equal, round, and reactive to light.   Neck: Normal range of motion. Neck supple. No tracheal deviation present. No thyromegaly present.   Cardiovascular: Normal rate, regular rhythm and normal heart sounds.    No murmur heard.  Pulmonary/Chest: Effort normal. No respiratory distress. He has wheezes. He has no rales. He exhibits no tenderness.   Abdominal: Soft. Bowel sounds are normal. He exhibits no distension and no mass. There is no hepatosplenomegaly or splenomegaly. There is no tenderness. There is no rebound and no guarding. No hernia.   Musculoskeletal: Normal range of motion. He exhibits no edema or tenderness.   NROM all major joints   Lymphadenopathy:     He has no cervical adenopathy.        Right cervical: No superficial cervical, no deep cervical and no posterior cervical adenopathy present.       Left cervical: No superficial cervical, no deep cervical and no posterior cervical adenopathy present.   Neurological: He is alert and oriented to person, place, and time. Coordination and gait normal.   Skin: Skin is warm and dry. No rash noted.   Psychiatric: He has a normal mood and affect. His behavior is normal. Judgment and thought content normal.   Nursing note and vitals reviewed.      Assessment/Plan   Jacob was seen today for shortness of breath and sore throat.    Diagnoses and all orders for this visit:    Acute non-recurrent maxillary sinusitis  -     predniSONE (DELTASONE) 20 MG tablet; 3 po daily x 2 days, then 2 po daily x 2 days, then 1 po daily x 2 days, then 1/2 po daily x 4 days    Chronic obstructive pulmonary disease with acute exacerbation  -     predniSONE (DELTASONE) 20 MG  tablet; 3 po daily x 2 days, then 2 po daily x 2 days, then 1 po daily x 2 days, then 1/2 po daily x 4 days    Other orders  -     doxycycline (DORYX) 100 MG enteric coated tablet; Take 1 tablet by mouth 2 (Two) Times a Day for 10 days.     80 mg Medrol and 1 gm Rocephin, given in the clinic today. Po Prednisone prescribed today as well. Doxycycline prescribed for patient to start on Monday, if he is not feeling better.    Patient advised to take his Nebulizer treatments every 6 hours.    Patient was encouraged to keep me informed of any acute changes, lack of improvement, or any new concerning symptoms. Patient voiced understanding of all instructions and denied further questions.    Patient to RTC prn.

## 2018-02-01 NOTE — PATIENT INSTRUCTIONS
Preventive Care 65 Years and Older, Male  Preventive care refers to lifestyle choices and visits with your health care provider that can promote health and wellness.  What does preventive care include?  · A yearly physical exam. This is also called an annual well check.  · Dental exams once or twice a year.  · Routine eye exams. Ask your health care provider how often you should have your eyes checked.  · Personal lifestyle choices, including:  ¨ Daily care of your teeth and gums.  ¨ Regular physical activity.  ¨ Eating a healthy diet.  ¨ Avoiding tobacco and drug use.  ¨ Limiting alcohol use.  ¨ Practicing safe sex.  ¨ Taking low doses of aspirin every day.  ¨ Taking vitamin and mineral supplements as recommended by your health care provider.  What happens during an annual well check?  The services and screenings done by your health care provider during your annual well check will depend on your age, overall health, lifestyle risk factors, and family history of disease.  Counseling   Your health care provider may ask you questions about your:  · Alcohol use.  · Tobacco use.  · Drug use.  · Emotional well-being.  · Home and relationship well-being.  · Sexual activity.  · Eating habits.  · History of falls.  · Memory and ability to understand (cognition).  · Work and work environment.  Screening   You may have the following tests or measurements:  · Height, weight, and BMI.  · Blood pressure.  · Lipid and cholesterol levels. These may be checked every 5 years, or more frequently if you are over 50 years old.  · Skin check.  · Lung cancer screening. You may have this screening every year starting at age 55 if you have a 30-pack-year history of smoking and currently smoke or have quit within the past 15 years.  · Fecal occult blood test (FOBT) of the stool. You may have this test every year starting at age 50.  · Flexible sigmoidoscopy or colonoscopy. You may have a sigmoidoscopy every 5 years or a colonoscopy every 10  years starting at age 50.  · Prostate cancer screening. Recommendations will vary depending on your family history and other risks.  · Hepatitis C blood test.  · Hepatitis B blood test.  · Sexually transmitted disease (STD) testing.  · Diabetes screening. This is done by checking your blood sugar (glucose) after you have not eaten for a while (fasting). You may have this done every 1-3 years.  · Abdominal aortic aneurysm (AAA) screening. You may need this if you are a current or former smoker.  · Osteoporosis. You may be screened starting at age 70 if you are at high risk.  Talk with your health care provider about your test results, treatment options, and if necessary, the need for more tests.  Vaccines   Your health care provider may recommend certain vaccines, such as:  · Influenza vaccine. This is recommended every year.  · Tetanus, diphtheria, and acellular pertussis (Tdap, Td) vaccine. You may need a Td booster every 10 years.  · Varicella vaccine. You may need this if you have not been vaccinated.  · Zoster vaccine. You may need this after age 60.  · Measles, mumps, and rubella (MMR) vaccine. You may need at least one dose of MMR if you were born in 1957 or later. You may also need a second dose.  · Pneumococcal 13-valent conjugate (PCV13) vaccine. One dose is recommended after age 65.  · Pneumococcal polysaccharide (PPSV23) vaccine. One dose is recommended after age 65.  · Meningococcal vaccine. You may need this if you have certain conditions.  · Hepatitis A vaccine. You may need this if you have certain conditions or if you travel or work in places where you may be exposed to hepatitis A.  · Hepatitis B vaccine. You may need this if you have certain conditions or if you travel or work in places where you may be exposed to hepatitis B.  · Haemophilus influenzae type b (Hib) vaccine. You may need this if you have certain risk factors.  Talk to your health care provider about which screenings and vaccines you  need and how often you need them.  This information is not intended to replace advice given to you by your health care provider. Make sure you discuss any questions you have with your health care provider.  Document Released: 2017 Document Revised: 2017 Document Reviewed: 10/18/2016  [employer] Interactive Patient Education © 2017 [employer]        Medicare Wellness  Personal Prevention Plan of Service     Date of Office Visit:  2018  Encounter Provider:  Lacie Ernst MD  Place of Service:  [facility]  Patient Name: Jacob Rivers  :  1950    As part of the Medicare Wellness portion of your visit today, we are providing you with this personalized preventive plan of services (PPPS). This plan is based upon recommendations of the [place] Preventive Services Task Force (USPSTF) and the Advisory Committee on Immunization Practices (ACIP).    This lists the preventive care services that should be considered, and provides dates of when you are due. Items listed as completed are up-to-date and do not require any further intervention.    Health Maintenance   Topic Date Due   • ZOSTER VACCINE  2016   • HEMOGLOBIN A1C  10/03/2017   • LIPID PANEL  2018   • MEDICARE ANNUAL WELLNESS  2018   • DIABETIC FOOT EXAM  2018   • URINE MICROALBUMIN  2018   • DIABETIC EYE EXAM  2018   • COLONOSCOPY  2024   • TDAP/TD VACCINES (2 - Td) 2024   • HEPATITIS C SCREENING  Completed   • INFLUENZA VACCINE  Completed   • PNEUMOCOCCAL VACCINES (65+ LOW/MEDIUM RISK)  Addressed   • AAA SCREEN (ONE-TIME)  Completed       No orders of the defined types were placed in this encounter.      Return in about 3 months (around 2018).

## 2018-02-01 NOTE — PROGRESS NOTES
QUICK REFERENCE INFORMATION:  The ABCs of the Annual Wellness Visit    Subsequent Medicare Wellness Visit    HEALTH RISK ASSESSMENT    1950    Recent Hospitalizations:  No hospitalization(s) within the last year..        Current Medical Providers:  Patient Care Team:  Lacie Ernst MD as PCP - General  Lacie Ernst MD as PCP - Claims Attributed  Logan Mcdonald MD as Consulting Physician (Cardiology)  Doron Finley MD as Consulting Physician (Pulmonary Disease)  Ree Chino MD as Surgeon (General Surgery)  King Ritchie MD as Consulting Physician (Urology)        Smoking Status:  History   Smoking Status   • Former Smoker   Smokeless Tobacco   • Never Used       Alcohol Consumption:  History   Alcohol Use No       Depression Screen:   PHQ-2/PHQ-9 Depression Screening 2/1/2018   Little interest or pleasure in doing things 1   Feeling down, depressed, or hopeless 1   Trouble falling or staying asleep, or sleeping too much 1   Feeling tired or having little energy 1   Poor appetite or overeating 0   Feeling bad about yourself - or that you are a failure or have let yourself or your family down 0   Trouble concentrating on things, such as reading the newspaper or watching television 0   Moving or speaking so slowly that other people could have noticed. Or the opposite - being so fidgety or restless that you have been moving around a lot more than usual 0   Thoughts that you would be better off dead, or of hurting yourself in some way 0   Total Score 4   If you checked off any problems, how difficult have these problems made it for you to do your work, take care of things at home, or get along with other people? Not difficult at all       Health Habits and Functional and Cognitive Screening:  Functional & Cognitive Status 2/1/2018   Do you have difficulty preparing food and eating? No   Do you have difficulty bathing yourself, getting dressed or grooming yourself? No   Do you have  difficulty using the toilet? No   Do you have difficulty moving around from place to place? No   Do you have trouble with steps or getting out of a bed or a chair? No   In the past year have you fallen or experienced a near fall? Yes   Current Diet Limited Junk Food   Dental Exam Up to date   Eye Exam Up to date   Exercise (times per week) 0 times per week   Current Exercise Activities Include None   Do you need help using the phone?  No   Are you deaf or do you have serious difficulty hearing?  No   Do you need help with transportation? No   Do you need help shopping? No   Do you need help preparing meals?  No   Do you need help with housework?  No   Do you need help with laundry? No   Do you need help taking your medications? -   Do you need help managing money? No   Have you felt unusual stress, anger or loneliness in the last month? Yes   Who do you live with? Child   If you need help, do you have trouble finding someone available to you? No   Have you been bothered in the last four weeks by sexual problems? No   Do you have difficulty concentrating, remembering or making decisions? -           Does the patient have evidence of cognitive impairment? No    Aspirin use counseling: Taking ASA appropriately as indicated      Recent Lab Results:  CMP:  Lab Results   Component Value Date    BUN 16 01/06/2017    CREATININE 1.00 01/06/2017    EGFRIFNONA 75 01/06/2017    BCR 16.0 01/06/2017     01/06/2017    K 4.1 01/06/2017    CO2 37.0 (H) 01/06/2017    CALCIUM 9.5 01/06/2017    ALBUMIN 3.90 01/06/2017    LABIL2 1.3 (L) 01/06/2017    BILITOT 0.7 01/06/2017    ALKPHOS 87 01/06/2017    AST 11 01/06/2017    ALT 7 01/06/2017     Lipid Panel:  Lab Results   Component Value Date    CHOL 145 01/06/2017    TRIG 71 01/06/2017    HDL 54 01/06/2017    LDLDIRECT 74 01/06/2017     HbA1c:  Lab Results   Component Value Date    HGBA1C 6.00 04/03/2017       Visual Acuity:  No exam data present; pt decilnes as he is having regular  "check ups    Age-appropriate Screening Schedule:  Refer to the list below for future screening recommendations based on patient's age, sex and/or medical conditions. Orders for these recommended tests are listed in the plan section. The patient has been provided with a written plan.    Health Maintenance   Topic Date Due   • ZOSTER VACCINE  04/22/2016   • HEMOGLOBIN A1C  10/03/2017   • LIPID PANEL  01/06/2018   • DIABETIC FOOT EXAM  04/03/2018   • URINE MICROALBUMIN  04/03/2018   • DIABETIC EYE EXAM  09/04/2018   • COLONOSCOPY  01/01/2024   • TDAP/TD VACCINES (2 - Td) 02/17/2024   • INFLUENZA VACCINE  Completed   • PNEUMOCOCCAL VACCINES (65+ LOW/MEDIUM RISK)  Addressed        Subjective   History of Present Illness    Jacob Rivers is a 67 y.o. male who presents for an Subsequent Wellness Visit.    He has h/o COPD; feels he is finally over the flu like illness he had. He is concerned about several days of persistent nasal congestion, mild sore throat, bad taste in mouth as well as \"bad smell\". Mild facial pressure. No fever. No headache.    Has chronic conditions including COPD as above, CHF, CAD, HTN, GERD, NIDDM, hypothyroidism. Recently stable. Taking meds as rx'd. [facility] at which time [facility] will check his labs and refill meds.    He is unsure of last pneumococcal vaccine; appears he may have had PRevnar 13 according to our records.     The following portions of the patient's history were reviewed and updated as appropriate: allergies, current medications, past family history, past medical history, past social history, past surgical history and problem list.    Outpatient Medications Prior to Visit   Medication Sig Dispense Refill   • albuterol (PROVENTIL HFA;VENTOLIN HFA) 108 (90 BASE) MCG/ACT inhaler INHALE 2 PUFFS EVERY 4 HOURS AS NEEDED.     • albuterol (PROVENTIL) (2.5 MG/3ML) 0.083% nebulizer solution Take 2.5 mg by nebulization Every 4 (Four) Hours As Needed for " Wheezing. 90 mL 1   • aspirin 81 MG tablet Take 1 tablet by mouth daily.     • carvedilol (COREG) 3.125 MG tablet Take 1 tablet by mouth 2 (two) times a day with meals.     • cetirizine (ZyrTEC) 10 MG tablet Take 1 tablet by mouth daily.     • Cholecalciferol (VITAMIN D3) 5000 UNITS tablet Take 5,000 Units by mouth Daily. 30 tablet 5   • cyanocobalamin (VITAMIN B-12) 1000 MCG tablet Take 1 tablet by mouth 1 (one) time daily.     • enalapril (VASOTEC) 5 MG tablet Take 1 tablet by mouth 1 (one) time daily.     • ferrous sulfate 325 (65 FE) MG tablet Take 1 tablet by mouth 1 (one) time daily.     • fluticasone (FLONASE) 50 MCG/ACT nasal spray USE TWO SPRAY(S) IN EACH NOSTRIL ONCE DAILY 1 bottle 5   • folic acid (FOLVITE) 1 MG tablet Take 1 tablet by mouth daily.     • furosemide (LASIX) 40 MG tablet Take 1 tablet by mouth 1 (one) time daily.     • levothyroxine (SYNTHROID, LEVOTHROID) 75 MCG tablet Take 1 tablet by mouth 1 (one) time daily.     • meclizine (ANTIVERT) 12.5 MG tablet Take 1 tablet by mouth 3 (Three) Times a Day As Needed for dizziness or Nausea. 30 tablet 0   • montelukast (SINGULAIR) 10 MG tablet TAKE ONE TABLET BY MOUTH ONCE DAILY EVERY  NIGHT.  TAKE  ONE  TABLET  AT  BEDTIME  FOR  SEASONAL  ALLERGIES 30 tablet 5   • naproxen (NAPROSYN) 375 MG tablet Take 375 mg by mouth 2 (Two) Times a Day As Needed for Mild Pain (1-3).     • omeprazole (PriLOSEC) 20 MG capsule Take 2 capsules by mouth daily.     • potassium chloride (K-DUR,KLOR-CON) 20 MEQ CR tablet TAKE ONE TABLET BY MOUTH ONCE DAILY 30 tablet 11   • rosuvastatin (CRESTOR) 40 MG tablet Take 1 tablet by mouth daily.     • tamsulosin (FLOMAX) 0.4 MG capsule 24 hr capsule Take 1 capsule by mouth Every Night.     • tiotropium (SPIRIVA) 18 MCG per inhalation capsule daily. Inhale one dose using the handihaler device.     • traMADol (ULTRAM) 50 MG tablet Take 1 tablet by mouth 4 (four) times a day as needed.     • allopurinol (ZYLOPRIM) 100 MG tablet Take  1 tablet by mouth Daily. (Patient taking differently: Take 100 mg by mouth As Needed.) 30 tablet 2   • guaifenesin-codeine (GUAIFENESIN AC) 100-10 MG/5ML liquid 2 tsp po tid as needed for cough 180 mL 0   • predniSONE (DELTASONE) 20 MG tablet 3 po daily x 2 days, then 2 po daily x 2 days, then 1 po daily x 2 days, then 1/2 po daily x 4 days 14 tablet 0     No facility-administered medications prior to visit.        Patient Active Problem List   Diagnosis   • Arthritis   • Coronary arteriosclerosis in native artery   • Chronic obstructive pulmonary disease   • Congestive heart failure   • Diabetes mellitus   • Gastroesophageal reflux disease   • Hypercholesterolemia   • Hypertension   • Hypothyroidism   • Vitamin D deficiency   • Allergic rhinitis   • Abnormal chest x-ray   • Left scapholunate ligament tear   • Chronic pain of left wrist   • Osteoarthritis of left wrist   • Carpal tunnel syndrome of left wrist   • Nasal septal deviation   • History of BPH   • Pernicious anemia   • IgG1 deficiency   • Renal insufficiency   • Osteoarthritis of right knee   • Elevated uric acid in blood       Advance Care Planning:  has NO advance directive - information provided to the patient today    Identification of Risk Factors:  Risk factors include: cardiovascular risk, hearing limitations and polypharmacy.    Review of Systems   Constitutional: Positive for fatigue. Negative for appetite change, chills, diaphoresis, fever and unexpected weight change.   HENT: Positive for congestion, postnasal drip, rhinorrhea and sinus pressure. Negative for ear pain, mouth sores, nosebleeds, sneezing, sore throat and trouble swallowing.    Eyes: Negative for pain, discharge, redness and visual disturbance.   Respiratory: Negative for cough, chest tightness and wheezing. Shortness of breath: stable RAYO.    Cardiovascular: Negative for chest pain, palpitations and leg swelling.   Gastrointestinal: Negative for abdominal pain, diarrhea, nausea  and vomiting.   Genitourinary: Negative for dysuria and hematuria.   Musculoskeletal: Positive for arthralgias.   Skin: Negative for rash and wound.   Neurological: Positive for dizziness and headaches. Negative for weakness.   Hematological: Negative for adenopathy.   Psychiatric/Behavioral: Positive for sleep disturbance. Negative for confusion and dysphoric mood. The patient is not nervous/anxious.        Compared to one year ago, the patient feels his physical health is the same.  Compared to one year ago, the patient feels his mental health is the same.    Objective     Physical Exam   Constitutional: He is oriented to person, place, and time. He appears well-developed and well-nourished. He is cooperative. He does not appear ill. No distress.   HENT:   Head: Normocephalic and atraumatic.   Right Ear: Tympanic membrane, external ear and ear canal normal. Decreased hearing is noted.   Left Ear: Tympanic membrane, external ear and ear canal normal. Decreased hearing is noted.   Nose: Mucosal edema present. No rhinorrhea. Right sinus exhibits no maxillary sinus tenderness and no frontal sinus tenderness. Left sinus exhibits no maxillary sinus tenderness and no frontal sinus tenderness.   Mouth/Throat: Mucous membranes are normal. Mucous membranes are not dry. He has dentures. No oral lesions. Posterior oropharyngeal erythema present. No oropharyngeal exudate.   Eyes: Conjunctivae and lids are normal.   Neck: Neck supple. Normal carotid pulses present. Carotid bruit is not present. No thyroid mass and no thyromegaly present.   Cardiovascular: Normal rate, regular rhythm, normal heart sounds and intact distal pulses.    Pulmonary/Chest: No respiratory distress. He has decreased breath sounds (consistent with h/o lobectomy) in the left upper field, the left middle field and the left lower field. He has no wheezes. He has no rhonchi.       Vascular Status -  His exam exhibits no right foot edema. His exam exhibits no  "left foot edema.  Lymphadenopathy:     He has no cervical adenopathy.        Right: No supraclavicular adenopathy present.        Left: No supraclavicular adenopathy present.   Neurological: He is alert and oriented to person, place, and time. He has normal strength. Gait normal.   Skin: Skin is warm and dry. No bruising and no rash noted. There is pallor.   Psychiatric: He has a normal mood and affect. His behavior is normal. Cognition and memory are normal.   Nursing note and vitals reviewed.      Vitals:    02/01/18 0920   BP: 118/76   Pulse: 87   SpO2: 97%   Weight: 87.1 kg (192 lb)   Height: 180.3 cm (71\")     Rapid strep neg    Body mass index is 26.78 kg/(m^2).  Discussed the patient's BMI with him. BMI is above normal parameters. Follow-up plan includes:  educational material, exercise counseling and nutrition counseling.    Assessment/Plan   Patient Self-Management and Personalized Health Advice  The patient has been provided with information about: diet, exercise, prevention of cardiac or vascular disease, designing advance directives and mental health concerns and preventive services including:   · Advance directive, Exercise counseling provided, Fall Risk assessment done, Nutrition counseling provided, Pneumococcal vaccine , Prostate cancer screening discussed.    Visit Diagnoses:    ICD-10-CM ICD-9-CM   1. Annual physical exam Z00.00 V70.0   2. Coronary arteriosclerosis in native artery I25.10 414.01   3. Hypercholesterolemia E78.00 272.0   4. Essential hypertension I10 401.9   5. Chronic obstructive pulmonary disease with acute exacerbation J44.1 491.21   6. Gastroesophageal reflux disease, esophagitis presence not specified K21.9 530.81   7. Other specified hypothyroidism E03.8 244.8   8. Type 2 diabetes mellitus without complication, without long-term current use of insulin E11.9 250.00       No orders of the defined types were placed in this encounter.    Outpatient Encounter Prescriptions as of " 2/1/2018   Medication Sig Dispense Refill   • albuterol (PROVENTIL HFA;VENTOLIN HFA) 108 (90 BASE) MCG/ACT inhaler INHALE 2 PUFFS EVERY 4 HOURS AS NEEDED.     • albuterol (PROVENTIL) (2.5 MG/3ML) 0.083% nebulizer solution Take 2.5 mg by nebulization Every 4 (Four) Hours As Needed for Wheezing. 90 mL 1   • aspirin 81 MG tablet Take 1 tablet by mouth daily.     • carvedilol (COREG) 3.125 MG tablet Take 1 tablet by mouth 2 (two) times a day with meals.     • cetirizine (ZyrTEC) 10 MG tablet Take 1 tablet by mouth daily.     • Cholecalciferol (VITAMIN D3) 5000 UNITS tablet Take 5,000 Units by mouth Daily. 30 tablet 5   • cyanocobalamin (VITAMIN B-12) 1000 MCG tablet Take 1 tablet by mouth 1 (one) time daily.     • enalapril (VASOTEC) 5 MG tablet Take 1 tablet by mouth 1 (one) time daily.     • ferrous sulfate 325 (65 FE) MG tablet Take 1 tablet by mouth 1 (one) time daily.     • fluticasone (FLONASE) 50 MCG/ACT nasal spray USE TWO SPRAY(S) IN EACH NOSTRIL ONCE DAILY 1 bottle 5   • folic acid (FOLVITE) 1 MG tablet Take 1 tablet by mouth daily.     • furosemide (LASIX) 40 MG tablet Take 1 tablet by mouth 1 (one) time daily.     • levothyroxine (SYNTHROID, LEVOTHROID) 75 MCG tablet Take 1 tablet by mouth 1 (one) time daily.     • meclizine (ANTIVERT) 12.5 MG tablet Take 1 tablet by mouth 3 (Three) Times a Day As Needed for dizziness or Nausea. 30 tablet 0   • montelukast (SINGULAIR) 10 MG tablet TAKE ONE TABLET BY MOUTH ONCE DAILY EVERY  NIGHT.  TAKE  ONE  TABLET  AT  BEDTIME  FOR  SEASONAL  ALLERGIES 30 tablet 5   • naproxen (NAPROSYN) 375 MG tablet Take 375 mg by mouth 2 (Two) Times a Day As Needed for Mild Pain (1-3).     • omeprazole (PriLOSEC) 20 MG capsule Take 2 capsules by mouth daily.     • potassium chloride (K-DUR,KLOR-CON) 20 MEQ CR tablet TAKE ONE TABLET BY MOUTH ONCE DAILY 30 tablet 11   • rosuvastatin (CRESTOR) 40 MG tablet Take 1 tablet by mouth daily.     • tamsulosin (FLOMAX) 0.4 MG capsule 24 hr capsule  Take 1 capsule by mouth Every Night.     • tiotropium (SPIRIVA) 18 MCG per inhalation capsule daily. Inhale one dose using the handihaler device.     • traMADol (ULTRAM) 50 MG tablet Take 1 tablet by mouth 4 (four) times a day as needed.     • allopurinol (ZYLOPRIM) 100 MG tablet Take 1 tablet by mouth Daily. (Patient taking differently: Take 100 mg by mouth As Needed.) 30 tablet 2   • [DISCONTINUED] guaifenesin-codeine (GUAIFENESIN AC) 100-10 MG/5ML liquid 2 tsp po tid as needed for cough 180 mL 0   • [DISCONTINUED] predniSONE (DELTASONE) 20 MG tablet 3 po daily x 2 days, then 2 po daily x 2 days, then 1 po daily x 2 days, then 1/2 po daily x 4 days 14 tablet 0     No facility-administered encounter medications on file as of 2/1/2018.        Reviewed use of high risk medication in the elderly: yes  Reviewed for potential of harmful drug interactions in the elderly: yes    Multiple chronic conditions which are stable. [facility]. F/u with pulmonology as scheduled.    If sinus symptoms persist, consider imaging of sinuses. Rapid strep neg today,    Follow Up:  Return in about 3 months (around 5/1/2018).   Pt advised to eat a heart healthy diet and get regular aerobic exercise.  Patient was encouraged to keep me informed of any acute changes, lack of improvement, or any new concerning symptoms.  Pt is aware of reasons to seek emergent care.  Patient voiced understanding of all instructions and denied further questions.    An After Visit Summary and PPPS with all of these plans were given to the patient.

## 2018-02-07 NOTE — PROGRESS NOTES
"Subjective   Jacob Kalpesh Rivers is a 67 y.o. male.     History of Present Illness  Mr. Rivers is a 68 yo male with h/o COPD, s/p left lobectomy who presents today with c/o SOA and chest pain. He was tx'd for flu last fall. At his Medicare Wellness Visit 1 week ago he reported having had a \"flu like\" illness including sinus symptoms, sore throat, etc. Rapid strep was neg. Conservative mgnt advised. He felt improved until approx few days ago when he developed worsened fatigue. Last evening he developed moderate sharp retrosternal CP with inspiration only. Not assoc'd with dizziness, nausea, palpitations, no radiation. Some white sputum with his usual intermittent cough. He noted increased wheezing but no orthopnea, increased swelling. He reports feeling \"feverish and chilled\". C/o worsened RAYO. Of note, pt does have h/o CAD, CHF, hypothyroidism on replacement [facility].     The following portions of the patient's history were reviewed and updated as appropriate: allergies, current medications, past family history, past medical history, past social history, past surgical history and problem list.    Review of Systems   Constitutional: Positive for fatigue. Negative for appetite change, chills, diaphoresis, fever and unexpected weight change.   HENT: Positive for congestion and postnasal drip. Negative for ear pain, mouth sores, nosebleeds, rhinorrhea, sneezing, sore throat and trouble swallowing.    Eyes: Negative for pain, discharge, redness and visual disturbance.   Respiratory: Positive for cough, chest tightness, shortness of breath and wheezing.    Cardiovascular: Positive for chest pain. Negative for palpitations and leg swelling.   Gastrointestinal: Negative for abdominal pain, blood in stool, diarrhea, nausea and vomiting.   Genitourinary: Negative for dysuria and hematuria.   Musculoskeletal: Positive for arthralgias.   Skin: Negative for rash and wound.   Neurological: Positive for dizziness and " headaches. Negative for syncope and weakness.   Hematological: Negative for adenopathy.   Psychiatric/Behavioral: Negative for confusion and dysphoric mood. The patient is not nervous/anxious.        Objective    Vitals:    02/07/18 1014   BP: 122/76   Pulse: 90   Temp: 99.4 °F (37.4 °C)   SpO2: 95%     Body mass index is 26.64 kg/(m^2).  Last 2 weights    02/07/18  1014   Weight: 86.6 kg (191 lb)       Physical Exam   Constitutional: He is oriented to person, place, and time. He appears well-developed and well-nourished. He is cooperative. He does not appear ill. No distress.   Appears older than stated age   HENT:   Head: Normocephalic and atraumatic.   Right Ear: Tympanic membrane, external ear and ear canal normal. Decreased hearing is noted.   Left Ear: Tympanic membrane, external ear and ear canal normal. Decreased hearing is noted.   Nose: Mucosal edema present. No rhinorrhea.   Mouth/Throat: Oropharynx is clear and moist and mucous membranes are normal. Mucous membranes are not dry. He has dentures. No oral lesions.   Eyes: Conjunctivae and lids are normal.   Neck: Normal carotid pulses present. No thyroid mass and no thyromegaly present.   Mild hoarseness noted   Cardiovascular: Normal rate and intact distal pulses.  An irregularly irregular rhythm present. Frequent extrasystoles are present.   Pulmonary/Chest: Effort normal. No respiratory distress. He has decreased breath sounds (chronic due to previous lobectomy) in the left upper field, the left middle field and the left lower field. He has wheezes (coarse) in the right upper field, the right middle field and the right lower field. He has no rhonchi. He has rales (dry) in the right upper field, the right middle field and the right lower field.       Vascular Status -  His exam exhibits no right foot edema. His exam exhibits no left foot edema.  Lymphadenopathy:     He has no cervical adenopathy.   Neurological: He is alert and oriented to person, place,  and time. Gait normal.   Skin: Skin is warm and dry. No bruising and no rash noted. There is pallor.   Psychiatric: He has a normal mood and affect. His behavior is normal. Cognition and memory are normal.   Nursing note and vitals reviewed.      ECG 12 Lead  Date/Time: 2/7/2018 10:36 AM  Performed by: OLU FRAZIER  Authorized by: OLU FRAZIER   Comparison: compared with previous ECG from 10/31/2016  Similar to previous ECG  Comparison to previous ECG: Rate is faster at 98, QTc has decreased from 425, not currently in Trigemy as previously  Rhythm: sinus rhythm  Ectopy: frequent PVCs  Rate: normal  BPM: 98  Conduction: conduction normal  ST Elevation: V2, V3, V4 and V5  T elevation: V2, V3, V4 and V5  QRS axis: left  Other findings: LVH  Other findings comments: possible LAE  Clinical impression: abnormal ECG  Comments: HI int: 183 ms  QRS dur: 129  Ms  QTc: 392 ms          Rapid flu A/B neg.  Most recent labs reviewed on chart.  Previous echo reviewed on chart.    Assessment/Plan   Jacob was seen today for shortness of breath, fever and fatigue.    Diagnoses and all orders for this visit:    Atypical chest pain  -     XR Chest PA & Lateral; Future  -     ECG 12 Lead  -     XR Chest PA & Lateral    Acute bronchospasm  -     XR Chest PA & Lateral; Future  -     predniSONE (DELTASONE) 20 MG tablet; Take 1 tablet by mouth 2 (Two) Times a Day.  -     XR Chest PA & Lateral    Irregular heart rhythm  -     ECG 12 Lead    Acquired hypothyroidism    Chronic obstructive pulmonary disease with acute exacerbation  -     XR Chest PA & Lateral; Future  -     predniSONE (DELTASONE) 20 MG tablet; Take 1 tablet by mouth 2 (Two) Times a Day.  -     sulfamethoxazole-trimethoprim (BACTRIM DS,SEPTRA DS) 800-160 MG per tablet; Take 1 tablet by mouth 2 (Two) Times a Day.  -     XR Chest PA & Lateral    RAYO (dyspnea on exertion)  -     XR Chest PA & Lateral; Future  -     ECG 12 Lead  -     XR Chest PA & Lateral    Chronic combined  systolic and diastolic congestive heart failure    EKG similar to previous; increased rate and premature beats most likely related to underlying pulmonary disease. Tx for COPD exacerabation (abx, steroids, albuterol nebs as needed). Stat CXR to r/o pleural effusion, mass, pneumonia, etc. Does not appear fluid overloaded at this time.  I will contact patient regarding test results and provide instructions regarding any necessary changes in plan of care.  Patient was encouraged to keep me informed of any acute changes, lack of improvement, or any new concerning symptoms.  Pt is aware of reasons to seek emergent care.  Patient voiced understanding of all instructions and denied further questions.

## 2018-02-07 NOTE — TELEPHONE ENCOUNTER
----- Message from Lacie Ernst MD sent at 2/7/2018  1:06 PM EST -----  Please inform pt his recent CXR is stable. No fluid, no pneumonia. Take meds as rx'd and seek care at ER if symptoms acutely worsen.

## 2018-02-20 PROBLEM — D64.9 NORMOCYTIC ANEMIA: Status: ACTIVE | Noted: 2018-01-01

## 2018-02-20 NOTE — PROGRESS NOTES
"Subjective   Jacob Rivers is a 67 y.o. male.     History of Present Illness  Mr. Jacob Rivers is a 68 yo male who presents for a transitional care management visit.     As he was seen within 48 business hours after discharge telephone contact was not needed prior to this apt.        I reviewed the discharge summary, hospital problems, inpatient lab results, inpatient diagnostic studies, and consultation reports with Mr. Rivers.      Current outpatient and discharge medications have been reconciled for the patient.    He was admitted to [facility] on 2/13/18.  D/C from [facility] to Home on 2/16/18.  LACE score = 15 (high risk for readmission)    Discharge diagnoses included new lung mass, pneumonia, sepsis, renal insufficiency, COPD.    Course During Hospital Stay:   Mr. Rivers has h/o COPD and previous lung CA s/p left lobectomy as well as h/o CAD, [facility]  who presented to [facility] ER with worsening SOA and fever >102. White count was >20. CXR showed right hilar enlargement. F/u CT scan showed new right hilar mass concerning for recurrent malignancy. He was set up for outpt bronchoscopy which will be done in [place] tomorrow. He received standard tx for PNE, COPD, had pulmonary consultation,  and was dc'd on levaquin, prednisone and short term valium. No oxygen dependency at d/c. 96% on RA. He is taking d/c meds as rx'd. Feels \"much improved\" almost \"back to normal\". Mild RAYO, no cough, no CP, no fever, no hemoptysis, no palpitations. His appetite has improved. He is of course nervous about his upcoming bronchoscopy. He would like to get a glucometer to check his BG more closely. BP has been well controlled. No orthostatic symptoms. He does c/o \"sore tongue\" and is worried he has thrush.    Labs upon admission:  Flu neg  BNP 2343  Lactic acid normal    Bun/CR 28/1.9, Na 134 otherwise CMP wnl  Troponin neg  U/A normal other than pH 5.5  CBC with WBC 20 with monocytosis, H/H 12.3/38.3, plt 453  Blood cx " neg  Sputum cx + for light resp azalia  Viral cx neg    D/C labs:  WBC 13.6, plt 351, H/H 9.4/29.8  Bun/Cr 25/1.36, nml lytes  Iron 109  TIBC 169  Folate 14.8  Vitamin B12 1165  Ferritin 526    [facility] just prior to admission  A1c 6.5  WBC 10.7, H/H 10.1/31.7  PSA 0.950  TSH 1.09    TGs 94  HDL 69  LDL 62  BUN/Cr 36/1.81    The following portions of the patient's history were reviewed and updated as appropriate: allergies, current medications, past family history, past medical history, past social history, past surgical history and problem list.    Review of Systems   Constitutional: Positive for fatigue. Negative for appetite change, chills, diaphoresis, fever and unexpected weight change.   HENT: Positive for postnasal drip. Negative for congestion, ear pain, mouth sores, nosebleeds, rhinorrhea, sneezing, sore throat and trouble swallowing.    Eyes: Negative for pain, discharge, redness and visual disturbance.   Respiratory: Positive for shortness of breath (mild, RAYO, back to baseline). Negative for cough, chest tightness and wheezing.    Cardiovascular: Negative for chest pain, palpitations and leg swelling.   Gastrointestinal: Negative for abdominal pain, blood in stool, diarrhea, nausea and vomiting.   Endocrine: Positive for cold intolerance.   Genitourinary: Negative for dysuria and hematuria.   Musculoskeletal: Positive for arthralgias.   Skin: Negative for rash and wound.   Neurological: Negative for dizziness, syncope, weakness and headaches.   Hematological: Negative for adenopathy. Does not bruise/bleed easily.   Psychiatric/Behavioral: Negative for confusion, dysphoric mood and sleep disturbance. The patient is not nervous/anxious.        Objective    Vitals:    02/20/18 1115   BP: 118/76   Pulse: 96   Temp: 98.7 °F (37.1 °C)   SpO2: 98%     Body mass index is 26.36 kg/(m^2).  Last 2 weights    02/20/18  1115   Weight: 85.7 kg (189 lb)       Physical Exam   Constitutional: He is  oriented to person, place, and time. He appears well-developed and well-nourished. He is cooperative. He does not appear ill. No distress.   HENT:   Head: Normocephalic and atraumatic.   Right Ear: Tympanic membrane, external ear and ear canal normal. Decreased hearing is noted.   Left Ear: Tympanic membrane, external ear and ear canal normal. Decreased hearing is noted.   Nose: Nose normal.   Mouth/Throat: Mucous membranes are normal. Mucous membranes are not dry. He has dentures. No oral lesions. Posterior oropharyngeal erythema present. No oropharyngeal exudate.   Eyes: Conjunctivae and lids are normal.   Neck: Neck supple. Normal carotid pulses present. Carotid bruit is not present. No thyroid mass and no thyromegaly present.   Cardiovascular: Normal rate, regular rhythm, normal heart sounds and intact distal pulses.    Pulmonary/Chest: Effort normal. No respiratory distress. He has decreased breath sounds (consistent with h/o lobectomy) in the left upper field, the left middle field and the left lower field. He has no wheezes. He has no rhonchi. He has no rales.       Vascular Status -  His exam exhibits right foot edema (mild). His exam exhibits left foot edema (mild).  Lymphadenopathy:     He has no cervical adenopathy.        Right: No supraclavicular adenopathy present.        Left: No supraclavicular adenopathy present.   Neurological: He is alert and oriented to person, place, and time. He has normal strength. He displays no tremor. Gait normal.   Skin: Skin is warm and dry. No bruising and no rash noted. No pallor.   Psychiatric: He has a normal mood and affect. His behavior is normal. Cognition and memory are normal.   Nursing note and vitals reviewed.      Assessment/Plan   Jacob was seen today for hospital follow up and pneumonia.    Diagnoses and all orders for this visit:    Community acquired pneumonia of right lung, unspecified part of lung  -     CBC & Differential    Chronic obstructive pulmonary  disease, unspecified COPD type  -     CBC & Differential    Chronic kidney disease, unspecified CKD stage  -     Basic Metabolic Panel    Glossitis  -     nystatin (MYCOSTATIN) 551976 UNIT/ML suspension; Swish and swallow 5 mL 4 (Four) Times a Day.    Acquired hypothyroidism    Prediabetes    Essential hypertension    Hypercholesterolemia    Coronary arteriosclerosis in native artery    Chronic combined systolic and diastolic congestive heart failure    Normocytic anemia    Appears at pulmonary baseline. Complete course of prednisone and levaquin. He will have bronchoscopy tomorrow and f/u with Dr. Jones early next week.  CKD with recent acute worsening although d/c numbners much improved. Continue good hydration.  Appears euthyroid and recent TSH at goal.  Pre-DM which needs close monitoring. [facility]com[facility].  HTN controlled.  HLP with good use of statin.   CAD on ACE, BB, statin, ASA.  CHF well compensated at this time.  Anemia stable but persistent, possibly related to underlying recurrent neoplastic process and/or chronic kidney disease. He is UTD on colonoscopy.  I will contact patient regarding test results and provide instructions regarding any necessary changes in plan of care.  Patient was encouraged to keep me informed of any acute changes, lack of improvement, or any new concerning symptoms.  Pt is aware of reasons to seek emergent care.  Keep routine f/u apt, sooner as needed/instructed.  Patient voiced understanding of all instructions and denied further questions.    This HOMER visit requiring high complexity decision making.    Mr. Rivers suffers from chronic heart failure, generalized weakness and debilitation.  He has a history of and is at high risk for recurrent falls.  As a result of his advanced debilitation, he struggles to complete MR ADLs including toileting, feeding and grooming in a reasonable time.  Wheelchair will assist and benefit patient in  this regard.  Patient's mobility issues cannot be reconciled by use of cane or walker.  He is agreeable to wheelchair usage.  Patient's primary caregiver is able to provide assistance with the wheelchair as well.  Patient states his residence allows for adequate access to maneuver a wheelchair.

## 2018-02-20 NOTE — OUTREACH NOTE
The patient was discharged from [facility] 2/16/18 and has a [facility] hospital f/u appt with PCP Dr. Lacie Ernst 2/20/18 which is within 2 business days of discharge.

## 2018-03-19 NOTE — TELEPHONE ENCOUNTER
Patient called to get an order for a wheel chair to be faxed to [facility].  He sees the Doctor in [facility]. To get the results of his PET scan.

## 2018-12-17 NOTE — TELEPHONE ENCOUNTER
Pt called stating his oncologist, Dr Amaya, instructed him to call his PCP office for an ortho surgeon referral for his septic arthritis. Needs pt seen asap for L ankle pain and swelling.     yes